# Patient Record
Sex: MALE | URBAN - METROPOLITAN AREA
[De-identification: names, ages, dates, MRNs, and addresses within clinical notes are randomized per-mention and may not be internally consistent; named-entity substitution may affect disease eponyms.]

---

## 2018-10-10 ENCOUNTER — HOSPITAL ENCOUNTER (OUTPATIENT)
Dept: RADIOLOGY | Facility: HOSPITAL | Age: 53
Discharge: HOME | End: 2018-10-10
Attending: INTERNAL MEDICINE

## 2018-10-10 LAB — HIV 1+2 AB+HIV1 P24 AG SERPL QL IA: NORMAL

## 2020-11-18 LAB
CHOLEST SERPL-MSCNC: 199 MG/DL (ref 0–200)
HDLC SERPL-MCNC: 27 MG/DL
LDLC SERPL CALC-MCNC: 97 MG/DL (ref 0–160)
TRIGLYCERIDE (LIPID PAN): 445

## 2020-12-10 ENCOUNTER — HOSPITAL ENCOUNTER (OUTPATIENT)
Dept: RADIOLOGY | Facility: HOSPITAL | Age: 55
Discharge: HOME | End: 2020-12-10
Attending: INTERNAL MEDICINE

## 2020-12-17 PROBLEM — E78.2 MIXED HYPERLIPIDEMIA: Status: ACTIVE | Noted: 2020-12-17

## 2020-12-17 PROBLEM — R73.03 PREDIABETES: Status: ACTIVE | Noted: 2020-12-17

## 2020-12-17 PROBLEM — I10 ESSENTIAL HYPERTENSION: Status: ACTIVE | Noted: 2020-12-17

## 2020-12-17 PROBLEM — M25.50 MULTIPLE JOINT PAIN: Status: ACTIVE | Noted: 2020-12-17

## 2020-12-17 NOTE — PROGRESS NOTES
Subjective:       Patient ID: Erickson Decker is a 55 y.o. male.    Chief Complaint: Follow-up    Presenting for follow up on HTN. Was last seen in [facility] on 11/20/2020. Was switched from pravastatin to crestor 20 mg and started on HCTZ 25 mg at that time. Reports that blood pressures have now been 110s-130s/80s. Has had some increased joint pain with the colder weather, especially in his hands. No adverse side effects with new medications.     Review of Systems   Constitutional: Negative for activity change, appetite change, chills, diaphoresis, fatigue, fever and unexpected weight change.   HENT: Negative for congestion, ear discharge, ear pain, facial swelling, postnasal drip, rhinorrhea, sinus pressure, sinus pain, sneezing, sore throat and trouble swallowing.    Eyes: Negative for photophobia, pain, discharge, redness, itching and visual disturbance.   Respiratory: Negative for apnea, cough, choking, chest tightness, shortness of breath, wheezing and stridor.    Cardiovascular: Negative for chest pain and palpitations.   Gastrointestinal: Negative for abdominal distention, abdominal pain, blood in stool, constipation, diarrhea, nausea and vomiting.   Endocrine: Negative for cold intolerance, heat intolerance, polydipsia, polyphagia and polyuria.   Genitourinary: Negative for decreased urine volume, difficulty urinating, dysuria, enuresis, flank pain, frequency and urgency.   Musculoskeletal: Positive for arthralgias. Negative for back pain, gait problem, joint swelling, myalgias, neck pain and neck stiffness.   Skin: Negative for color change, pallor, rash and wound.   Allergic/Immunologic: Negative for environmental allergies, food allergies and immunocompromised state.   Neurological: Negative for dizziness, tremors, seizures, syncope, facial asymmetry, speech difficulty, weakness, light-headedness, numbness and headaches.   Hematological: Does not bruise/bleed easily.    Psychiatric/Behavioral: Negative for agitation, behavioral problems, confusion, decreased concentration, dysphoric mood, hallucinations, self-injury, sleep disturbance and suicidal ideas. The patient is not nervous/anxious and is not hyperactive.        Patient Active Problem List   Diagnosis    Essential hypertension    Mixed hyperlipidemia    Multiple joint pain    Prediabetes       Objective:      Physical Exam  Vitals signs and nursing note reviewed.   Constitutional:       General: He is awake.      Appearance: Normal appearance. He is well-developed. He is obese.   HENT:      Head: Normocephalic and atraumatic.      Nose: Nose normal.      Mouth/Throat:      Mouth: Mucous membranes are moist.   Eyes:      General: Lids are normal.      Pupils: Pupils are equal, round, and reactive to light.   Neck:      Musculoskeletal: Normal range of motion and neck supple.      Vascular: No carotid bruit.   Cardiovascular:      Rate and Rhythm: Normal rate and regular rhythm.      Heart sounds: Normal heart sounds.   Pulmonary:      Effort: Pulmonary effort is normal.      Breath sounds: Normal breath sounds.   Abdominal:      General: Bowel sounds are normal.      Palpations: Abdomen is soft.   Musculoskeletal: Normal range of motion.      Right hand: He exhibits tenderness.      Left hand: He exhibits tenderness.      Right lower leg: No edema.      Left lower leg: No edema.   Skin:     General: Skin is warm and dry.   Neurological:      General: No focal deficit present.      Mental Status: He is alert and oriented to person, place, and time. Mental status is at baseline.   Psychiatric:         Attention and Perception: Attention and perception normal.         Mood and Affect: Mood normal.         Speech: Speech normal.         Behavior: Behavior normal. Behavior is cooperative.         Thought Content: Thought content normal.         Cognition and Memory: Cognition and memory normal.         Judgment: Judgment  normal.         No results found for: WBC, HGB, HCT, PLT, CHOL, TRIG, HDL, LDLDIRECT, ALT, AST, NA, K, CL, CREATININE, BUN, CO2, TSH, PSA, INR, GLUF, HGBA1C, MICROALBUR  The ASCVD Risk score ([place] ALVARENGA Jr., et al., 2013) failed to calculate for the following reasons:    Cannot find a previous HDL lab    Cannot find a previous total cholesterol lab    Assessment:       1. Essential hypertension    2. Mixed hyperlipidemia    3. Multiple joint pain    4. Prediabetes    5. BMI 32.0-32.9,adult        Plan:       Erickson was seen today for follow-up.    Diagnoses and all orders for this visit:    Essential hypertension  -     hydroCHLOROthiazide (HYDRODIURIL) 25 MG tablet; Take 1 tablet (25 mg total) by mouth once daily.    Mixed hyperlipidemia  -     rosuvastatin (CRESTOR) 20 MG tablet; Take 1 tablet (20 mg total) by mouth once daily.        -     Lipid Panel; Future    Multiple joint pain  -     meloxicam (MOBIC) 15 MG tablet; Take 1 tablet (15 mg total) by mouth once daily.    Prediabetes  -Stable. Decrease starch and sugar in diet.     BMI 32.0-32.9,adult  -Low fat diet. Avoid foods high in animal fats and fried, greasy foods.   Exercise encouraged. Recommend 150 minutes of moderate aerobic exercise a week.      Follow up in about 6 months (around 6/18/2021), or if symptoms worsen or fail to improve.

## 2020-12-18 ENCOUNTER — OFFICE VISIT (OUTPATIENT)
Dept: FAMILY MEDICINE | Facility: CLINIC | Age: 55
End: 2020-12-18
Payer: OTHER GOVERNMENT

## 2020-12-18 VITALS
HEART RATE: 109 BPM | WEIGHT: 225 LBS | OXYGEN SATURATION: 97 % | TEMPERATURE: 97 F | SYSTOLIC BLOOD PRESSURE: 142 MMHG | BODY MASS INDEX: 32.21 KG/M2 | DIASTOLIC BLOOD PRESSURE: 88 MMHG | HEIGHT: 70 IN | RESPIRATION RATE: 16 BRPM

## 2020-12-18 DIAGNOSIS — E78.2 MIXED HYPERLIPIDEMIA: ICD-10-CM

## 2020-12-18 DIAGNOSIS — M25.50 MULTIPLE JOINT PAIN: ICD-10-CM

## 2020-12-18 DIAGNOSIS — I10 ESSENTIAL HYPERTENSION: Primary | ICD-10-CM

## 2020-12-18 DIAGNOSIS — R73.03 PREDIABETES: ICD-10-CM

## 2020-12-18 PROCEDURE — 99204 OFFICE O/P NEW MOD 45 MIN: CPT | Mod: S$GLB,,, | Performed by: NURSE PRACTITIONER

## 2020-12-18 PROCEDURE — 99204 PR OFFICE/OUTPT VISIT, NEW, LEVL IV, 45-59 MIN: ICD-10-PCS | Mod: S$GLB,,, | Performed by: NURSE PRACTITIONER

## 2020-12-18 RX ORDER — MELOXICAM 15 MG/1
15 TABLET ORAL DAILY
Qty: 90 TABLET | Refills: 1 | Status: SHIPPED | OUTPATIENT
Start: 2020-12-18 | End: 2021-05-26 | Stop reason: SDUPTHER

## 2020-12-18 RX ORDER — HYDROCHLOROTHIAZIDE 25 MG/1
25 TABLET ORAL DAILY
Qty: 90 TABLET | Refills: 1 | Status: SHIPPED | OUTPATIENT
Start: 2020-12-18 | End: 2021-05-26 | Stop reason: SDUPTHER

## 2020-12-18 RX ORDER — ROSUVASTATIN CALCIUM 20 MG/1
20 TABLET, COATED ORAL DAILY
Qty: 90 TABLET | Refills: 1 | Status: SHIPPED | OUTPATIENT
Start: 2020-12-18 | End: 2021-05-26 | Stop reason: SDUPTHER

## 2020-12-18 RX ORDER — HYDROCHLOROTHIAZIDE 25 MG/1
25 TABLET ORAL DAILY
COMMUNITY
Start: 2020-11-20 | End: 2020-12-18 | Stop reason: SDUPTHER

## 2020-12-18 RX ORDER — AMOXICILLIN 500 MG
CAPSULE ORAL DAILY
COMMUNITY

## 2020-12-18 RX ORDER — ROSUVASTATIN CALCIUM 20 MG/1
20 TABLET, COATED ORAL DAILY
COMMUNITY
Start: 2020-11-20 | End: 2020-12-18 | Stop reason: SDUPTHER

## 2020-12-18 RX ORDER — MELOXICAM 15 MG/1
15 TABLET ORAL DAILY
COMMUNITY
End: 2020-12-18 | Stop reason: SDUPTHER

## 2020-12-18 NOTE — LETTER
December 18, 2020    Erickson Decker  [address] [place] [address] [address] [place] [address]             [address]  [place] [address]  Phone: [phone]  Fax: [phone] To Whom It May Concern,     Mr. Erickson Decker is an established patient with our clinic with history of hypertension. Blood pressures have been controlled and monitored. Patient is cleared to work.     If you have any questions or concerns, please don't hesitate to call [phone].    Sincerely,        KAILYN Arnold

## 2020-12-18 NOTE — PATIENT INSTRUCTIONS
Low fat diet. Avoid foods high in animal fats and fried, greasy foods.   Exercise encouraged. Recommend 150 minutes of moderate aerobic exercise a week.  Decrease starch and sugar in diet.     Lipid panel in 3 months.

## 2021-01-14 DIAGNOSIS — Z12.11 COLON CANCER SCREENING: ICD-10-CM

## 2021-03-23 ENCOUNTER — LAB VISIT (OUTPATIENT)
Dept: LAB | Facility: CLINIC | Age: 56
End: 2021-03-23
Payer: OTHER GOVERNMENT

## 2021-03-23 DIAGNOSIS — E78.2 MIXED HYPERLIPIDEMIA: ICD-10-CM

## 2021-03-23 LAB
CHOLEST SERPL-MCNC: 148 MG/DL (ref 120–199)
CHOLEST/HDLC SERPL: 5.7 {RATIO} (ref 2–5)
HDLC SERPL-MCNC: 26 MG/DL (ref 40–75)
HDLC SERPL: 17.6 % (ref 20–50)
LDLC SERPL CALC-MCNC: 46 MG/DL (ref 63–159)
NONHDLC SERPL-MCNC: 122 MG/DL
TRIGL SERPL-MCNC: 380 MG/DL (ref 30–150)

## 2021-03-23 PROCEDURE — 36415 COLL VENOUS BLD VENIPUNCTURE: CPT | Mod: ,,, | Performed by: NURSE PRACTITIONER

## 2021-03-23 PROCEDURE — 36415 PR COLLECTION VENOUS BLOOD,VENIPUNCTURE: ICD-10-PCS | Mod: ,,, | Performed by: NURSE PRACTITIONER

## 2021-03-23 PROCEDURE — 80061 LIPID PANEL: CPT | Performed by: NURSE PRACTITIONER

## 2021-04-05 ENCOUNTER — PATIENT MESSAGE (OUTPATIENT)
Dept: ADMINISTRATIVE | Facility: HOSPITAL | Age: 56
End: 2021-04-05

## 2021-05-12 ENCOUNTER — PATIENT OUTREACH (OUTPATIENT)
Dept: ADMINISTRATIVE | Facility: HOSPITAL | Age: 56
End: 2021-05-12

## 2021-05-12 DIAGNOSIS — Z11.59 ENCOUNTER FOR HEPATITIS C SCREENING TEST FOR LOW RISK PATIENT: Primary | ICD-10-CM

## 2021-05-19 ENCOUNTER — PATIENT OUTREACH (OUTPATIENT)
Dept: ADMINISTRATIVE | Facility: HOSPITAL | Age: 56
End: 2021-05-19

## 2021-05-26 ENCOUNTER — OFFICE VISIT (OUTPATIENT)
Dept: FAMILY MEDICINE | Facility: CLINIC | Age: 56
End: 2021-05-26
Payer: OTHER GOVERNMENT

## 2021-05-26 VITALS
HEIGHT: 70 IN | WEIGHT: 224.38 LBS | HEART RATE: 88 BPM | DIASTOLIC BLOOD PRESSURE: 84 MMHG | SYSTOLIC BLOOD PRESSURE: 126 MMHG | BODY MASS INDEX: 32.12 KG/M2 | TEMPERATURE: 99 F | RESPIRATION RATE: 16 BRPM | OXYGEN SATURATION: 97 %

## 2021-05-26 DIAGNOSIS — M25.50 MULTIPLE JOINT PAIN: ICD-10-CM

## 2021-05-26 DIAGNOSIS — B35.4 TINEA CORPORIS: ICD-10-CM

## 2021-05-26 DIAGNOSIS — E78.2 MIXED HYPERLIPIDEMIA: ICD-10-CM

## 2021-05-26 DIAGNOSIS — I10 ESSENTIAL HYPERTENSION: Primary | ICD-10-CM

## 2021-05-26 PROCEDURE — 99214 OFFICE O/P EST MOD 30 MIN: CPT | Mod: S$GLB,,, | Performed by: NURSE PRACTITIONER

## 2021-05-26 PROCEDURE — 99214 PR OFFICE/OUTPT VISIT, EST, LEVL IV, 30-39 MIN: ICD-10-PCS | Mod: S$GLB,,, | Performed by: NURSE PRACTITIONER

## 2021-05-26 RX ORDER — CLOTRIMAZOLE AND BETAMETHASONE DIPROPIONATE 10; .64 MG/G; MG/G
CREAM TOPICAL 2 TIMES DAILY
Qty: 15 G | Refills: 2 | Status: SHIPPED | OUTPATIENT
Start: 2021-05-26 | End: 2022-05-23

## 2021-05-26 RX ORDER — DICLOFENAC SODIUM 10 MG/G
2 GEL TOPICAL 4 TIMES DAILY
Qty: 100 G | Refills: 2 | Status: SHIPPED | OUTPATIENT
Start: 2021-05-26 | End: 2021-11-23 | Stop reason: SDUPTHER

## 2021-05-26 RX ORDER — ROSUVASTATIN CALCIUM 20 MG/1
20 TABLET, COATED ORAL DAILY
Qty: 90 TABLET | Refills: 1 | Status: SHIPPED | OUTPATIENT
Start: 2021-05-26 | End: 2021-06-04

## 2021-05-26 RX ORDER — HYDROCHLOROTHIAZIDE 25 MG/1
25 TABLET ORAL DAILY
Qty: 90 TABLET | Refills: 1 | Status: SHIPPED | OUTPATIENT
Start: 2021-05-26 | End: 2021-11-23 | Stop reason: SDUPTHER

## 2021-05-26 RX ORDER — MELOXICAM 15 MG/1
15 TABLET ORAL DAILY
Qty: 90 TABLET | Refills: 1 | Status: SHIPPED | OUTPATIENT
Start: 2021-05-26 | End: 2021-11-23 | Stop reason: SDUPTHER

## 2021-05-31 ENCOUNTER — LAB VISIT (OUTPATIENT)
Dept: LAB | Facility: CLINIC | Age: 56
End: 2021-05-31
Payer: OTHER GOVERNMENT

## 2021-05-31 ENCOUNTER — TELEPHONE (OUTPATIENT)
Dept: FAMILY MEDICINE | Facility: CLINIC | Age: 56
End: 2021-05-31

## 2021-05-31 DIAGNOSIS — E78.2 MIXED HYPERLIPIDEMIA: ICD-10-CM

## 2021-05-31 LAB
CHOLEST SERPL-MCNC: 142 MG/DL (ref 120–199)
CHOLEST/HDLC SERPL: 5.1 {RATIO} (ref 2–5)
HDLC SERPL-MCNC: 28 MG/DL (ref 40–75)
HDLC SERPL: 19.7 % (ref 20–50)
LDLC SERPL CALC-MCNC: 43 MG/DL (ref 63–159)
NONHDLC SERPL-MCNC: 114 MG/DL
TRIGL SERPL-MCNC: 355 MG/DL (ref 30–150)

## 2021-05-31 PROCEDURE — 80061 LIPID PANEL: CPT | Performed by: NURSE PRACTITIONER

## 2021-05-31 PROCEDURE — 36415 COLL VENOUS BLD VENIPUNCTURE: CPT | Mod: ,,, | Performed by: NURSE PRACTITIONER

## 2021-05-31 PROCEDURE — 36415 PR COLLECTION VENOUS BLOOD,VENIPUNCTURE: ICD-10-PCS | Mod: ,,, | Performed by: NURSE PRACTITIONER

## 2021-06-02 ENCOUNTER — PATIENT MESSAGE (OUTPATIENT)
Dept: ADMINISTRATIVE | Facility: HOSPITAL | Age: 56
End: 2021-06-02

## 2021-07-07 ENCOUNTER — PATIENT MESSAGE (OUTPATIENT)
Dept: ADMINISTRATIVE | Facility: HOSPITAL | Age: 56
End: 2021-07-07

## 2021-08-03 ENCOUNTER — PATIENT OUTREACH (OUTPATIENT)
Dept: ADMINISTRATIVE | Facility: HOSPITAL | Age: 56
End: 2021-08-03

## 2021-11-23 ENCOUNTER — OFFICE VISIT (OUTPATIENT)
Dept: FAMILY MEDICINE | Facility: CLINIC | Age: 56
End: 2021-11-23
Payer: OTHER GOVERNMENT

## 2021-11-23 ENCOUNTER — HOSPITAL ENCOUNTER (OUTPATIENT)
Dept: RADIOLOGY | Facility: CLINIC | Age: 56
Discharge: HOME | End: 2021-11-23
Attending: STUDENT IN AN ORGANIZED HEALTH CARE EDUCATION/TRAINING PROGRAM
Payer: OTHER GOVERNMENT

## 2021-11-23 ENCOUNTER — PATIENT MESSAGE (OUTPATIENT)
Dept: ADMINISTRATIVE | Facility: HOSPITAL | Age: 56
End: 2021-11-23
Payer: OTHER GOVERNMENT

## 2021-11-23 VITALS
HEART RATE: 99 BPM | HEIGHT: 70 IN | SYSTOLIC BLOOD PRESSURE: 126 MMHG | TEMPERATURE: 98 F | OXYGEN SATURATION: 98 % | BODY MASS INDEX: 32.47 KG/M2 | DIASTOLIC BLOOD PRESSURE: 78 MMHG | WEIGHT: 226.81 LBS | RESPIRATION RATE: 18 BRPM

## 2021-11-23 DIAGNOSIS — R60.0 PERIPHERAL EDEMA: ICD-10-CM

## 2021-11-23 DIAGNOSIS — Z12.5 SCREENING FOR PROSTATE CANCER: ICD-10-CM

## 2021-11-23 DIAGNOSIS — M25.40 JOINT SWELLING: ICD-10-CM

## 2021-11-23 DIAGNOSIS — M79.642 BILATERAL HAND PAIN: ICD-10-CM

## 2021-11-23 DIAGNOSIS — M79.641 BILATERAL HAND PAIN: ICD-10-CM

## 2021-11-23 DIAGNOSIS — E78.1 PURE HYPERTRIGLYCERIDEMIA: ICD-10-CM

## 2021-11-23 DIAGNOSIS — R73.03 PREDIABETES: ICD-10-CM

## 2021-11-23 DIAGNOSIS — I10 ESSENTIAL HYPERTENSION: Primary | ICD-10-CM

## 2021-11-23 DIAGNOSIS — M25.50 MULTIPLE JOINT PAIN: ICD-10-CM

## 2021-11-23 PROBLEM — F17.200 NICOTINE DEPENDENCE: Status: ACTIVE | Noted: 2021-11-23

## 2021-11-23 PROBLEM — H52.4 PRESBYOPIA: Status: ACTIVE | Noted: 2021-11-23

## 2021-11-23 PROCEDURE — 73130 XR HAND COMPLETE 3 VIEW LEFT: ICD-10-PCS | Mod: LT,S$GLB,, | Performed by: RADIOLOGY

## 2021-11-23 PROCEDURE — 73130 X-RAY EXAM OF HAND: CPT | Mod: RT,S$GLB,, | Performed by: RADIOLOGY

## 2021-11-23 PROCEDURE — 99214 PR OFFICE/OUTPT VISIT, EST, LEVL IV, 30-39 MIN: ICD-10-PCS | Mod: S$GLB,,, | Performed by: STUDENT IN AN ORGANIZED HEALTH CARE EDUCATION/TRAINING PROGRAM

## 2021-11-23 PROCEDURE — 73130 X-RAY EXAM OF HAND: CPT | Mod: LT,S$GLB,, | Performed by: RADIOLOGY

## 2021-11-23 PROCEDURE — 99214 OFFICE O/P EST MOD 30 MIN: CPT | Mod: S$GLB,,, | Performed by: STUDENT IN AN ORGANIZED HEALTH CARE EDUCATION/TRAINING PROGRAM

## 2021-11-23 RX ORDER — MELOXICAM 15 MG/1
15 TABLET ORAL DAILY
Qty: 90 TABLET | Refills: 3 | Status: SHIPPED | OUTPATIENT
Start: 2021-11-23 | End: 2022-11-17

## 2021-11-23 RX ORDER — DICLOFENAC SODIUM 10 MG/G
2 GEL TOPICAL 4 TIMES DAILY
Qty: 100 G | Refills: 2 | Status: SHIPPED | OUTPATIENT
Start: 2021-11-23 | End: 2022-06-01

## 2021-11-23 RX ORDER — FAMOTIDINE 40 MG/1
40 TABLET, FILM COATED ORAL DAILY
COMMUNITY
End: 2022-05-23

## 2021-11-23 RX ORDER — HYDROCHLOROTHIAZIDE 25 MG/1
25 TABLET ORAL DAILY
Qty: 90 TABLET | Refills: 3 | Status: SHIPPED | OUTPATIENT
Start: 2021-11-23 | End: 2022-11-17

## 2021-11-23 RX ORDER — ROSUVASTATIN CALCIUM 20 MG/1
20 TABLET, COATED ORAL DAILY
Qty: 90 TABLET | Refills: 3 | Status: SHIPPED | OUTPATIENT
Start: 2021-11-23 | End: 2022-11-17

## 2021-11-23 RX ORDER — CETIRIZINE HYDROCHLORIDE 10 MG/1
10 TABLET ORAL DAILY
COMMUNITY
End: 2022-05-23

## 2021-11-24 ENCOUNTER — LAB VISIT (OUTPATIENT)
Dept: LAB | Facility: CLINIC | Age: 56
End: 2021-11-24
Payer: OTHER GOVERNMENT

## 2021-11-24 DIAGNOSIS — R73.03 PREDIABETES: ICD-10-CM

## 2021-11-24 DIAGNOSIS — I10 ESSENTIAL HYPERTENSION: ICD-10-CM

## 2021-11-24 DIAGNOSIS — Z12.5 SCREENING FOR PROSTATE CANCER: ICD-10-CM

## 2021-11-24 DIAGNOSIS — Z11.59 ENCOUNTER FOR HEPATITIS C SCREENING TEST FOR LOW RISK PATIENT: ICD-10-CM

## 2021-11-24 DIAGNOSIS — E78.1 PURE HYPERTRIGLYCERIDEMIA: ICD-10-CM

## 2021-11-24 LAB
ALBUMIN SERPL BCP-MCNC: 3.8 G/DL (ref 3.5–5.2)
ALBUMIN/CREAT UR: 9.5 UG/MG (ref 0–30)
ALP SERPL-CCNC: 82 U/L (ref 55–135)
ALT SERPL W/O P-5'-P-CCNC: 26 U/L (ref 10–44)
ANION GAP SERPL CALC-SCNC: 12 MMOL/L (ref 8–16)
AST SERPL-CCNC: 19 U/L (ref 10–40)
BASOPHILS # BLD AUTO: 0.05 K/UL (ref 0–0.2)
BASOPHILS NFR BLD: 0.7 % (ref 0–1.9)
BILIRUB SERPL-MCNC: 0.4 MG/DL (ref 0.1–1)
BUN SERPL-MCNC: 16 MG/DL (ref 6–20)
CALCIUM SERPL-MCNC: 9.6 MG/DL (ref 8.7–10.5)
CHLORIDE SERPL-SCNC: 103 MMOL/L (ref 95–110)
CHOLEST SERPL-MCNC: 141 MG/DL (ref 120–199)
CHOLEST/HDLC SERPL: 5 {RATIO} (ref 2–5)
CO2 SERPL-SCNC: 24 MMOL/L (ref 23–29)
COMPLEXED PSA SERPL-MCNC: 0.51 NG/ML (ref 0–4)
CREAT SERPL-MCNC: 0.9 MG/DL (ref 0.5–1.4)
CREAT UR-MCNC: 158 MG/DL (ref 23–375)
DIFFERENTIAL METHOD: ABNORMAL
EOSINOPHIL # BLD AUTO: 0.4 K/UL (ref 0–0.5)
EOSINOPHIL NFR BLD: 5 % (ref 0–8)
ERYTHROCYTE [DISTWIDTH] IN BLOOD BY AUTOMATED COUNT: 14.3 % (ref 11.5–14.5)
EST. GFR  (AFRICAN AMERICAN): >60 ML/MIN/1.73 M^2
EST. GFR  (NON AFRICAN AMERICAN): >60 ML/MIN/1.73 M^2
ESTIMATED AVG GLUCOSE: 126 MG/DL (ref 68–131)
GLUCOSE SERPL-MCNC: 101 MG/DL (ref 70–110)
HBA1C MFR BLD: 6 % (ref 4–5.6)
HCT VFR BLD AUTO: 43 % (ref 40–54)
HDLC SERPL-MCNC: 28 MG/DL (ref 40–75)
HDLC SERPL: 19.9 % (ref 20–50)
HGB BLD-MCNC: 13.4 G/DL (ref 14–18)
IMM GRANULOCYTES # BLD AUTO: 0.02 K/UL (ref 0–0.04)
IMM GRANULOCYTES NFR BLD AUTO: 0.3 % (ref 0–0.5)
LDLC SERPL CALC-MCNC: 60 MG/DL (ref 63–159)
LYMPHOCYTES # BLD AUTO: 2.2 K/UL (ref 1–4.8)
LYMPHOCYTES NFR BLD: 29.4 % (ref 18–48)
MCH RBC QN AUTO: 27.4 PG (ref 27–31)
MCHC RBC AUTO-ENTMCNC: 31.2 G/DL (ref 32–36)
MCV RBC AUTO: 88 FL (ref 82–98)
MICROALBUMIN UR DL<=1MG/L-MCNC: 15 UG/ML
MONOCYTES # BLD AUTO: 0.5 K/UL (ref 0.3–1)
MONOCYTES NFR BLD: 6.2 % (ref 4–15)
NEUTROPHILS # BLD AUTO: 4.4 K/UL (ref 1.8–7.7)
NEUTROPHILS NFR BLD: 58.4 % (ref 38–73)
NONHDLC SERPL-MCNC: 113 MG/DL
NRBC BLD-RTO: 0 /100 WBC
PLATELET # BLD AUTO: 303 K/UL (ref 150–450)
PMV BLD AUTO: 10.4 FL (ref 9.2–12.9)
POTASSIUM SERPL-SCNC: 4 MMOL/L (ref 3.5–5.1)
PROT SERPL-MCNC: 8 G/DL (ref 6–8.4)
RBC # BLD AUTO: 4.89 M/UL (ref 4.6–6.2)
SODIUM SERPL-SCNC: 139 MMOL/L (ref 136–145)
TRIGL SERPL-MCNC: 265 MG/DL (ref 30–150)
TSH SERPL DL<=0.005 MIU/L-ACNC: 0.97 UIU/ML (ref 0.4–4)
WBC # BLD AUTO: 7.55 K/UL (ref 3.9–12.7)

## 2021-11-24 PROCEDURE — 80053 COMPREHEN METABOLIC PANEL: CPT | Performed by: STUDENT IN AN ORGANIZED HEALTH CARE EDUCATION/TRAINING PROGRAM

## 2021-11-24 PROCEDURE — 36415 PR COLLECTION VENOUS BLOOD,VENIPUNCTURE: ICD-10-PCS | Mod: ,,, | Performed by: STUDENT IN AN ORGANIZED HEALTH CARE EDUCATION/TRAINING PROGRAM

## 2021-11-24 PROCEDURE — 86803 HEPATITIS C AB TEST: CPT | Performed by: NURSE PRACTITIONER

## 2021-11-24 PROCEDURE — 85025 COMPLETE CBC W/AUTO DIFF WBC: CPT | Performed by: STUDENT IN AN ORGANIZED HEALTH CARE EDUCATION/TRAINING PROGRAM

## 2021-11-24 PROCEDURE — 84443 ASSAY THYROID STIM HORMONE: CPT | Performed by: STUDENT IN AN ORGANIZED HEALTH CARE EDUCATION/TRAINING PROGRAM

## 2021-11-24 PROCEDURE — 82570 ASSAY OF URINE CREATININE: CPT | Performed by: STUDENT IN AN ORGANIZED HEALTH CARE EDUCATION/TRAINING PROGRAM

## 2021-11-24 PROCEDURE — 80061 LIPID PANEL: CPT | Performed by: STUDENT IN AN ORGANIZED HEALTH CARE EDUCATION/TRAINING PROGRAM

## 2021-11-24 PROCEDURE — 36415 COLL VENOUS BLD VENIPUNCTURE: CPT | Mod: ,,, | Performed by: STUDENT IN AN ORGANIZED HEALTH CARE EDUCATION/TRAINING PROGRAM

## 2021-11-24 PROCEDURE — 84153 ASSAY OF PSA TOTAL: CPT | Performed by: STUDENT IN AN ORGANIZED HEALTH CARE EDUCATION/TRAINING PROGRAM

## 2021-11-24 PROCEDURE — 83036 HEMOGLOBIN GLYCOSYLATED A1C: CPT | Performed by: STUDENT IN AN ORGANIZED HEALTH CARE EDUCATION/TRAINING PROGRAM

## 2021-11-26 LAB — HCV AB SERPL QL IA: NEGATIVE

## 2021-12-14 ENCOUNTER — OFFICE VISIT (OUTPATIENT)
Dept: URGENT CARE | Facility: CLINIC | Age: 56
End: 2021-12-14
Payer: OTHER GOVERNMENT

## 2021-12-14 VITALS
BODY MASS INDEX: 32.35 KG/M2 | RESPIRATION RATE: 18 BRPM | OXYGEN SATURATION: 97 % | SYSTOLIC BLOOD PRESSURE: 151 MMHG | HEIGHT: 70 IN | WEIGHT: 226 LBS | HEART RATE: 85 BPM | DIASTOLIC BLOOD PRESSURE: 93 MMHG | TEMPERATURE: 98 F

## 2021-12-14 DIAGNOSIS — I10 HYPERTENSION, UNSPECIFIED TYPE: ICD-10-CM

## 2021-12-14 DIAGNOSIS — Z11.52 ENCOUNTER FOR SCREENING FOR COVID-19: Primary | ICD-10-CM

## 2021-12-14 LAB
CTP QC/QA: YES
SARS-COV-2 AG RESP QL IA.RAPID: NEGATIVE

## 2021-12-14 PROCEDURE — 99203 OFFICE O/P NEW LOW 30 MIN: CPT | Mod: S$GLB,,, | Performed by: NURSE PRACTITIONER

## 2021-12-14 PROCEDURE — 87426 SARSCOV CORONAVIRUS AG IA: CPT | Mod: QW,S$GLB,, | Performed by: NURSE PRACTITIONER

## 2021-12-14 PROCEDURE — 87426 SARS CORONAVIRUS 2 ANTIGEN POCT: ICD-10-PCS | Mod: QW,S$GLB,, | Performed by: NURSE PRACTITIONER

## 2021-12-14 PROCEDURE — 99203 PR OFFICE/OUTPT VISIT, NEW, LEVL III, 30-44 MIN: ICD-10-PCS | Mod: S$GLB,,, | Performed by: NURSE PRACTITIONER

## 2022-05-23 ENCOUNTER — OFFICE VISIT (OUTPATIENT)
Dept: FAMILY MEDICINE | Facility: CLINIC | Age: 57
End: 2022-05-23
Payer: OTHER GOVERNMENT

## 2022-05-23 ENCOUNTER — LAB VISIT (OUTPATIENT)
Dept: LAB | Facility: CLINIC | Age: 57
End: 2022-05-23
Payer: OTHER GOVERNMENT

## 2022-05-23 VITALS
BODY MASS INDEX: 32.45 KG/M2 | SYSTOLIC BLOOD PRESSURE: 128 MMHG | RESPIRATION RATE: 18 BRPM | WEIGHT: 226.63 LBS | DIASTOLIC BLOOD PRESSURE: 80 MMHG | OXYGEN SATURATION: 97 % | TEMPERATURE: 99 F | HEART RATE: 80 BPM | HEIGHT: 70 IN

## 2022-05-23 DIAGNOSIS — M25.40 JOINT SWELLING: ICD-10-CM

## 2022-05-23 DIAGNOSIS — E78.1 PURE HYPERTRIGLYCERIDEMIA: ICD-10-CM

## 2022-05-23 DIAGNOSIS — R60.0 PERIPHERAL EDEMA: ICD-10-CM

## 2022-05-23 DIAGNOSIS — M25.50 MULTIPLE JOINT PAIN: ICD-10-CM

## 2022-05-23 DIAGNOSIS — R73.03 PREDIABETES: ICD-10-CM

## 2022-05-23 DIAGNOSIS — I10 ESSENTIAL HYPERTENSION: Primary | ICD-10-CM

## 2022-05-23 PROCEDURE — 99999 PR PBB SHADOW E&M-EST. PATIENT-LVL IV: ICD-10-PCS | Mod: PBBFAC,,, | Performed by: STUDENT IN AN ORGANIZED HEALTH CARE EDUCATION/TRAINING PROGRAM

## 2022-05-23 PROCEDURE — 83036 HEMOGLOBIN GLYCOSYLATED A1C: CPT | Performed by: STUDENT IN AN ORGANIZED HEALTH CARE EDUCATION/TRAINING PROGRAM

## 2022-05-23 PROCEDURE — 36415 COLL VENOUS BLD VENIPUNCTURE: CPT | Mod: PN,,, | Performed by: STUDENT IN AN ORGANIZED HEALTH CARE EDUCATION/TRAINING PROGRAM

## 2022-05-23 PROCEDURE — 99214 OFFICE O/P EST MOD 30 MIN: CPT | Mod: PBBFAC,PN | Performed by: STUDENT IN AN ORGANIZED HEALTH CARE EDUCATION/TRAINING PROGRAM

## 2022-05-23 PROCEDURE — 99214 OFFICE O/P EST MOD 30 MIN: CPT | Mod: S$PBB,,, | Performed by: STUDENT IN AN ORGANIZED HEALTH CARE EDUCATION/TRAINING PROGRAM

## 2022-05-23 PROCEDURE — 99214 PR OFFICE/OUTPT VISIT, EST, LEVL IV, 30-39 MIN: ICD-10-PCS | Mod: S$PBB,,, | Performed by: STUDENT IN AN ORGANIZED HEALTH CARE EDUCATION/TRAINING PROGRAM

## 2022-05-23 PROCEDURE — 99999 PR PBB SHADOW E&M-EST. PATIENT-LVL IV: CPT | Mod: PBBFAC,,, | Performed by: STUDENT IN AN ORGANIZED HEALTH CARE EDUCATION/TRAINING PROGRAM

## 2022-05-23 PROCEDURE — 36415 PR COLLECTION VENOUS BLOOD,VENIPUNCTURE: ICD-10-PCS | Mod: PN,,, | Performed by: STUDENT IN AN ORGANIZED HEALTH CARE EDUCATION/TRAINING PROGRAM

## 2022-05-23 NOTE — PATIENT INSTRUCTIONS

## 2022-05-23 NOTE — ASSESSMENT & PLAN NOTE
Continue  Hyperlipidemia Medications             omega-3 fatty acids/fish oil (FISH OIL-OMEGA-3 FATTY ACIDS) 300-1,000 mg capsule Take by mouth once daily.    rosuvastatin (CRESTOR) 20 MG tablet Take 1 tablet (20 mg total) by mouth once daily.

## 2022-05-23 NOTE — ASSESSMENT & PLAN NOTE
Continue  Hypertension Medications             hydroCHLOROthiazide (HYDRODIURIL) 25 MG tablet Take 1 tablet (25 mg total) by mouth once daily.

## 2022-05-23 NOTE — PROGRESS NOTES
Subjective:       Patient ID: Erickson Decker is a 56 y.o. male.    Chief Complaint: Follow-up (6 month ) and Hypertension    Essential hypertension: Not regularly checking bps.  BP Readings from Last 3 Encounters:  12/14/21 : (!) 151/93  11/23/21 : 126/78  05/26/21 : 126/84     Hypertension Medications     hydroCHLOROthiazide (HYDRODIURIL) 25 MG tablet Take 1 tablet (25 mg total) by mouth once daily.       Patient denies headache, chest pain, palpitations, shortness of breath.     Hyperlipidemia: Patient's hyperlipidemia is well controlled. Taking Crestor daily with side effects: none. Lab Results       Component                Value               Date                       CHOL                     141                 11/24/2021                 CHOL                     142                 05/31/2021                 CHOL                     148                 03/23/2021            Lab Results       Component                Value               Date                       HDL                      28 (L)              11/24/2021                 HDL                      28 (L)              05/31/2021                 HDL                      26 (L)              03/23/2021            Lab Results       Component                Value               Date                       LDLCALC                  60.0 (L)            11/24/2021                 LDLCALC                  43.0 (L)            05/31/2021                 LDLCALC                  46.0 (L)            03/23/2021            Lab Results       Component                Value               Date                       TRIG                     265 (H)             11/24/2021                 TRIG                     355 (H)             05/31/2021                 TRIG                     380 (H)             03/23/2021            Lab Results       Component                Value               Date                       CHOLHDL                  19.9 (L)            11/24/2021                  CHOLHDL                  19.7 (L)            05/31/2021                 CHOLHDL                  17.6 (L)            03/23/2021                The 10-year ASCVD risk score ([place] ALVARENGA Jr., et al., 2013) is: 9.8%    Values used to calculate the score:      Age: 56 years      Sex: Male      Is Non- : No      Diabetic: No      Tobacco smoker: No      Systolic Blood Pressure: 151 mmHg      Is BP treated: No      HDL Cholesterol: 28 mg/dL      Total Cholesterol: 141 mg/dL    Prediabetes- not on medication.  Lab Results       Component                Value               Date                       HGBA1C                   6.0 (H)             11/24/2021                Review of Systems   Constitutional: Negative for activity change, appetite change, fatigue and unexpected weight change.   HENT: Negative for trouble swallowing.    Respiratory: Negative for shortness of breath.    Cardiovascular: Negative for chest pain and leg swelling.   Gastrointestinal: Negative for abdominal pain, blood in stool, change in bowel habit and change in bowel habit.   Endocrine: Negative for cold intolerance, heat intolerance, polydipsia and polyuria.   Genitourinary: Negative for decreased urine volume, difficulty urinating, dysuria, frequency, hematuria and urgency.   Musculoskeletal: Positive for arthralgias and joint swelling. Negative for back pain and gait problem.   Integumentary:  Negative for rash and wound.   Neurological: Negative for dizziness, weakness and headaches.   Psychiatric/Behavioral: Negative for dysphoric mood and sleep disturbance.         Objective:      Physical Exam  Constitutional:       General: He is not in acute distress.     Appearance: Normal appearance. He is not ill-appearing.   Eyes:      Conjunctiva/sclera: Conjunctivae normal.   Cardiovascular:      Rate and Rhythm: Normal rate and regular rhythm.      Pulses: Normal pulses.      Heart sounds: Normal heart sounds. No  murmur heard.  Pulmonary:      Effort: Pulmonary effort is normal. No respiratory distress.      Breath sounds: Normal breath sounds. No wheezing.   Abdominal:      Palpations: Abdomen is soft.   Musculoskeletal:      Right lower leg: No edema.      Left lower leg: No edema.   Skin:     General: Skin is warm and dry.      Findings: No rash.   Neurological:      Mental Status: He is alert. Mental status is at baseline.      Gait: Gait normal.   Psychiatric:         Mood and Affect: Mood normal.         Behavior: Behavior normal.         Thought Content: Thought content normal.         Judgment: Judgment normal.         Assessment:       1. Essential hypertension    2. Pure hypertriglyceridemia    3. Prediabetes    4. Peripheral edema    5. Joint swelling    6. Multiple joint pain        Plan:       Problem List Items Addressed This Visit        Cardiac/Vascular    Essential hypertension - Primary     Continue  Hypertension Medications             hydroCHLOROthiazide (HYDRODIURIL) 25 MG tablet Take 1 tablet (25 mg total) by mouth once daily.                   Pure hypertriglyceridemia     Continue  Hyperlipidemia Medications             omega-3 fatty acids/fish oil (FISH OIL-OMEGA-3 FATTY ACIDS) 300-1,000 mg capsule Take by mouth once daily.    rosuvastatin (CRESTOR) 20 MG tablet Take 1 tablet (20 mg total) by mouth once daily.                      Endocrine    Prediabetes     Continue diet control           Relevant Orders    Hemoglobin A1C       Orthopedic    Multiple joint pain     Doing well on prn mobic           Joint swelling       Other    Peripheral edema     Doing better                   Medication List with Changes/Refills   Current Medications    DICLOFENAC SODIUM (VOLTAREN) 1 % GEL    Apply 2 g topically 4 (four) times daily.       Start Date: 11/23/2021End Date: --    HYDROCHLOROTHIAZIDE (HYDRODIURIL) 25 MG TABLET    Take 1 tablet (25 mg total) by mouth once daily.       Start Date: 11/23/2021End Date:  2/21/2022    MELOXICAM (MOBIC) 15 MG TABLET    Take 1 tablet (15 mg total) by mouth once daily.       Start Date: 11/23/2021End Date: --    OMEGA-3 FATTY ACIDS/FISH OIL (FISH OIL-OMEGA-3 FATTY ACIDS) 300-1,000 MG CAPSULE    Take by mouth once daily.       Start Date: --        End Date: --    ROSUVASTATIN (CRESTOR) 20 MG TABLET    Take 1 tablet (20 mg total) by mouth once daily.       Start Date: 11/23/2021End Date: --   Discontinued Medications    CETIRIZINE (ZYRTEC) 10 MG TABLET    Take 10 mg by mouth once daily.       Start Date: --        End Date: 5/23/2022    CLOTRIMAZOLE-BETAMETHASONE 1-0.05% (LOTRISONE) CREAM    Apply topically 2 (two) times daily.       Start Date: 5/26/2021 End Date: 5/23/2022    FAMOTIDINE (PEPCID) 40 MG TABLET    Take 40 mg by mouth once daily.       Start Date: --        End Date: 5/23/2022

## 2022-05-24 LAB
ESTIMATED AVG GLUCOSE: 123 MG/DL (ref 68–131)
HBA1C MFR BLD: 5.9 % (ref 4–5.6)

## 2022-06-27 ENCOUNTER — OFFICE VISIT (OUTPATIENT)
Dept: URGENT CARE | Facility: CLINIC | Age: 57
End: 2022-06-27
Payer: OTHER GOVERNMENT

## 2022-06-27 VITALS
RESPIRATION RATE: 16 BRPM | SYSTOLIC BLOOD PRESSURE: 154 MMHG | WEIGHT: 226 LBS | TEMPERATURE: 98 F | DIASTOLIC BLOOD PRESSURE: 100 MMHG | BODY MASS INDEX: 32.43 KG/M2 | OXYGEN SATURATION: 98 % | HEART RATE: 102 BPM

## 2022-06-27 DIAGNOSIS — U07.1 COVID-19: ICD-10-CM

## 2022-06-27 DIAGNOSIS — U07.1 COVID-19 VIRUS DETECTED: ICD-10-CM

## 2022-06-27 DIAGNOSIS — Z20.822 CLOSE EXPOSURE TO COVID-19 VIRUS: ICD-10-CM

## 2022-06-27 DIAGNOSIS — R09.81 SINUS CONGESTION: Primary | ICD-10-CM

## 2022-06-27 LAB
CTP QC/QA: YES
SARS-COV-2 AG RESP QL IA.RAPID: POSITIVE

## 2022-06-27 PROCEDURE — 99214 OFFICE O/P EST MOD 30 MIN: CPT | Mod: S$GLB,,, | Performed by: STUDENT IN AN ORGANIZED HEALTH CARE EDUCATION/TRAINING PROGRAM

## 2022-06-27 PROCEDURE — 99214 PR OFFICE/OUTPT VISIT, EST, LEVL IV, 30-39 MIN: ICD-10-PCS | Mod: S$GLB,,, | Performed by: STUDENT IN AN ORGANIZED HEALTH CARE EDUCATION/TRAINING PROGRAM

## 2022-06-27 PROCEDURE — 87811 SARS-COV-2 COVID19 W/OPTIC: CPT | Mod: QW,S$GLB,, | Performed by: STUDENT IN AN ORGANIZED HEALTH CARE EDUCATION/TRAINING PROGRAM

## 2022-06-27 PROCEDURE — 87811 SARS CORONAVIRUS 2 ANTIGEN POCT, MANUAL READ: ICD-10-PCS | Mod: QW,S$GLB,, | Performed by: STUDENT IN AN ORGANIZED HEALTH CARE EDUCATION/TRAINING PROGRAM

## 2022-06-27 RX ORDER — FAMOTIDINE 40 MG/1
40 TABLET, FILM COATED ORAL DAILY
Qty: 10 TABLET | Refills: 0 | Status: SHIPPED | OUTPATIENT
Start: 2022-06-27 | End: 2022-07-04

## 2022-06-27 RX ORDER — PROMETHAZINE HYDROCHLORIDE AND DEXTROMETHORPHAN HYDROBROMIDE 6.25; 15 MG/5ML; MG/5ML
5 SYRUP ORAL
Qty: 118 ML | Refills: 0 | Status: SHIPPED | OUTPATIENT
Start: 2022-06-27 | End: 2022-07-07

## 2022-06-27 RX ORDER — AMOXICILLIN AND CLAVULANATE POTASSIUM 875; 125 MG/1; MG/1
1 TABLET, FILM COATED ORAL EVERY 12 HOURS
Qty: 20 TABLET | Refills: 0 | Status: SHIPPED | OUTPATIENT
Start: 2022-06-27 | End: 2022-07-07

## 2022-06-27 RX ORDER — FLUTICASONE PROPIONATE 50 MCG
1 SPRAY, SUSPENSION (ML) NASAL DAILY
Qty: 15.8 ML | Refills: 0 | Status: SHIPPED | OUTPATIENT
Start: 2022-06-27 | End: 2022-11-23

## 2022-06-27 RX ORDER — CETIRIZINE HYDROCHLORIDE 10 MG/1
10 TABLET ORAL DAILY
Qty: 10 TABLET | Refills: 0 | Status: SHIPPED | OUTPATIENT
Start: 2022-06-27 | End: 2022-11-23

## 2022-06-27 NOTE — PROGRESS NOTES
Subjective:       Patient ID: Erickson Decker is a 56 y.o. male.    Vitals:  weight is 102.5 kg (226 lb). His temperature is 98.4 °F (36.9 °C). His blood pressure is 154/100 (abnormal) and his pulse is 102. His respiration is 16 and oxygen saturation is 98%.     Chief Complaint: Sinus Problem    Patient is a 56-year-old male with a past medical history of hypertension, hyperlipidemia, and prediabetes who presents to clinic for COVID testing.  Patient reports he is vaccinated.  Patient reports positive COVID exposure.  Patient reports last exposure was 3 days ago.  Patient reports was contacted by his employer yesterday stating positive COVID exposure and he needed to be tested before returning.  Patient reports symptoms x2 days.  Patient complaining of fatigue, nasal sinus congestion with postnasal drainage, sinus pain and pressure, sore throat, right ear pain, productive cough, generalized body aches, generalized headaches, and sneezing.  Patient reports fatigue likely due to lack of sleeping from nasal sinus congestion and cough.  Patient denies any acute dizziness, fever or chills, chest pain or palpitations, shortness of breath or abnormal breath sounds, abdominal pain, nausea or vomiting, diarrhea, rash, or change in mentation.  Patient reports no over-the-counter medications for symptoms at this point.  Patient states has been standing hot shower to help with nasal congestion which only helps for a few hours.    Sinus Problem  This is a new problem. The current episode started yesterday. The problem is unchanged. There has been no fever. Associated symptoms include congestion, coughing, ear pain (Right), headaches, sinus pressure, sneezing and a sore throat. Pertinent negatives include no chills, diaphoresis or shortness of breath. Past treatments include nothing.       Constitution: Positive for fatigue. Negative for chills, sweating and fever.   HENT: Positive for ear pain (Right), congestion, postnasal  drip, sinus pain, sinus pressure and sore throat. Negative for ear discharge, tinnitus, hearing loss and trouble swallowing.    Neck: neck negative. Negative for painful lymph nodes.   Cardiovascular: Negative.  Negative for chest pain and palpitations.   Eyes: Negative.    Respiratory: Positive for cough and sputum production. Negative for chest tightness, shortness of breath and wheezing.    Gastrointestinal: Negative.  Negative for abdominal pain, nausea, vomiting and diarrhea.   Endocrine: negative.   Genitourinary: Negative.  Negative for dysuria, frequency and urgency.   Musculoskeletal: Positive for muscle ache.   Skin: Negative.  Negative for color change, pale, rash and erythema.   Allergic/Immunologic: Positive for sneezing.   Neurological: Positive for headaches. Negative for dizziness, light-headedness, passing out, disorientation and altered mental status.   Hematologic/Lymphatic: Negative.  Negative for swollen lymph nodes.   Psychiatric/Behavioral: Negative.  Negative for altered mental status, disorientation and confusion.       Objective:      Physical Exam   Constitutional: He is oriented to person, place, and time. He appears well-developed. He is cooperative.  Non-toxic appearance. He appears ill. No distress.   HENT:   Head: Normocephalic and atraumatic.   Ears:   Right Ear: Hearing, external ear and ear canal normal. A middle ear effusion is present.   Left Ear: Hearing, tympanic membrane, external ear and ear canal normal.   Nose: Rhinorrhea and congestion present. No mucosal edema or nasal deformity. No epistaxis. Right sinus exhibits maxillary sinus tenderness. Right sinus exhibits no frontal sinus tenderness. Left sinus exhibits maxillary sinus tenderness. Left sinus exhibits no frontal sinus tenderness.   Mouth/Throat: Uvula is midline and mucous membranes are normal. Mucous membranes are moist. No trismus in the jaw. Normal dentition. No uvula swelling. Posterior oropharyngeal erythema  and cobblestoning present. No oropharyngeal exudate. Oropharynx is clear.   Eyes: Conjunctivae and lids are normal. Pupils are equal, round, and reactive to light. Right eye exhibits no discharge. Left eye exhibits no discharge. No scleral icterus.   Neck: Trachea normal and phonation normal. Neck supple. No neck rigidity present.   Cardiovascular: Normal rate, regular rhythm, normal heart sounds and normal pulses.   Pulmonary/Chest: Effort normal and breath sounds normal. No respiratory distress (Unlabored.  Equal rise and fall of chest.  Able speak in full complete sentences.). He has no wheezes. He has no rhonchi. He has no rales.   Abdominal: Normal appearance and bowel sounds are normal. He exhibits no distension. Soft. There is no abdominal tenderness.   Musculoskeletal: Normal range of motion.         General: No deformity. Normal range of motion.      Cervical back: He exhibits no tenderness.   Lymphadenopathy:     He has no cervical adenopathy.   Neurological: He is alert and oriented to person, place, and time. He exhibits normal muscle tone. Coordination normal.   Skin: Skin is warm, dry, intact, not diaphoretic, not pale and no rash. Capillary refill takes less than 2 seconds. No erythema   Psychiatric: His speech is normal and behavior is normal. Judgment and thought content normal.   Nursing note and vitals reviewed.        Assessment:       1. Sinus congestion    2. Close exposure to COVID-19 virus    3. COVID-19          Plan:         Sinus congestion  -     SARS Coronavirus 2 Antigen, POCT Manual Read    Close exposure to COVID-19 virus  -     SARS Coronavirus 2 Antigen, POCT Manual Read    COVID-19    Other orders  -     amoxicillin-clavulanate 875-125mg (AUGMENTIN) 875-125 mg per tablet; Take 1 tablet by mouth every 12 (twelve) hours. for 10 days  Dispense: 20 tablet; Refill: 0  -     fluticasone propionate (FLONASE) 50 mcg/actuation nasal spray; 1 spray (50 mcg total) by Each Nostril route once  daily.  Dispense: 15.8 mL; Refill: 0  -     cetirizine (ZYRTEC) 10 MG tablet; Take 1 tablet (10 mg total) by mouth once daily. for 10 days  Dispense: 10 tablet; Refill: 0  -     famotidine (PEPCID) 40 MG tablet; Take 1 tablet (40 mg total) by mouth once daily. for 10 days  Dispense: 10 tablet; Refill: 0  -     promethazine-dextromethorphan (PROMETHAZINE-DM) 6.25-15 mg/5 mL Syrp; Take 5 mLs by mouth every 4 to 6 hours as needed (Cough).  Dispense: 118 mL; Refill: 0                 Labs:  COVID positive.  Quarantine as directed.  Quarantine information provided to patient.  COVID recommendations provided.  (Vitamin-C, vitamin D3, Pepcid, Zyrtec, zinc)  Tylenol per package instructions for any pain or fever.  May rotate with Motrin if unable to control pain or fever along with Tylenol.  Monitor home SpO2 and present to emergency department with readings less than 92%.  Take medications as prescribed.  Assure adequate hydration.  Follow-up with PCP in 1-2 days.  Return to clinic as needed.  To ED for any new or acutely worsening symptoms including but not limited to chest pain, palpitations, shortness of breath, or fever greater than 103° F.  Patient in agreement with plan of care.    DISCLAIMER: Please note that my documentation in this Electronic Healthcare Record was produced using speech recognition software and therefore may contain errors related to that software system.These could include grammar, punctuation and spelling errors or the inclusion/exclusion of phrases that were not intended. Garbled syntax, mangled pronouns, and other bizarre constructions may be attributed to that software system.

## 2022-09-28 ENCOUNTER — PATIENT MESSAGE (OUTPATIENT)
Dept: FAMILY MEDICINE | Facility: CLINIC | Age: 57
End: 2022-09-28
Payer: OTHER GOVERNMENT

## 2022-09-28 ENCOUNTER — TELEPHONE (OUTPATIENT)
Dept: FAMILY MEDICINE | Facility: CLINIC | Age: 57
End: 2022-09-28
Payer: OTHER GOVERNMENT

## 2022-09-28 NOTE — TELEPHONE ENCOUNTER
LVM for pt to return call to clinic to reschedule November appointment due to provider moving to another location.   Portal message sent

## 2022-11-23 ENCOUNTER — OFFICE VISIT (OUTPATIENT)
Dept: FAMILY MEDICINE | Facility: CLINIC | Age: 57
End: 2022-11-23
Payer: OTHER GOVERNMENT

## 2022-11-23 ENCOUNTER — LAB VISIT (OUTPATIENT)
Dept: LAB | Facility: CLINIC | Age: 57
End: 2022-11-23
Payer: OTHER GOVERNMENT

## 2022-11-23 VITALS
HEART RATE: 91 BPM | WEIGHT: 224.63 LBS | BODY MASS INDEX: 32.16 KG/M2 | OXYGEN SATURATION: 96 % | TEMPERATURE: 99 F | SYSTOLIC BLOOD PRESSURE: 140 MMHG | HEIGHT: 70 IN | DIASTOLIC BLOOD PRESSURE: 70 MMHG

## 2022-11-23 DIAGNOSIS — R73.03 PREDIABETES: ICD-10-CM

## 2022-11-23 DIAGNOSIS — M25.50 MULTIPLE JOINT PAIN: ICD-10-CM

## 2022-11-23 DIAGNOSIS — R53.83 FATIGUE, UNSPECIFIED TYPE: ICD-10-CM

## 2022-11-23 DIAGNOSIS — Z12.5 SCREENING FOR PROSTATE CANCER: ICD-10-CM

## 2022-11-23 DIAGNOSIS — M25.431 PAIN AND SWELLING OF RIGHT WRIST: ICD-10-CM

## 2022-11-23 DIAGNOSIS — M79.642 BILATERAL HAND PAIN: ICD-10-CM

## 2022-11-23 DIAGNOSIS — M25.40 JOINT SWELLING: ICD-10-CM

## 2022-11-23 DIAGNOSIS — M79.641 BILATERAL HAND PAIN: ICD-10-CM

## 2022-11-23 DIAGNOSIS — R60.0 PERIPHERAL EDEMA: ICD-10-CM

## 2022-11-23 DIAGNOSIS — E78.1 PURE HYPERTRIGLYCERIDEMIA: Primary | ICD-10-CM

## 2022-11-23 DIAGNOSIS — I10 ESSENTIAL HYPERTENSION: ICD-10-CM

## 2022-11-23 DIAGNOSIS — M25.531 PAIN AND SWELLING OF RIGHT WRIST: ICD-10-CM

## 2022-11-23 DIAGNOSIS — E78.1 PURE HYPERTRIGLYCERIDEMIA: ICD-10-CM

## 2022-11-23 LAB
ALBUMIN SERPL BCP-MCNC: 4.1 G/DL (ref 3.5–5.2)
ALP SERPL-CCNC: 81 U/L (ref 55–135)
ALT SERPL W/O P-5'-P-CCNC: 25 U/L (ref 10–44)
ANION GAP SERPL CALC-SCNC: 10 MMOL/L (ref 8–16)
AST SERPL-CCNC: 19 U/L (ref 10–40)
BASOPHILS # BLD AUTO: 0.04 K/UL (ref 0–0.2)
BASOPHILS NFR BLD: 0.6 % (ref 0–1.9)
BILIRUB SERPL-MCNC: 0.4 MG/DL (ref 0.1–1)
BUN SERPL-MCNC: 18 MG/DL (ref 6–20)
CALCIUM SERPL-MCNC: 10.1 MG/DL (ref 8.7–10.5)
CHLORIDE SERPL-SCNC: 103 MMOL/L (ref 95–110)
CHOLEST SERPL-MCNC: 148 MG/DL (ref 120–199)
CHOLEST/HDLC SERPL: 4.9 {RATIO} (ref 2–5)
CO2 SERPL-SCNC: 28 MMOL/L (ref 23–29)
COMPLEXED PSA SERPL-MCNC: 0.44 NG/ML (ref 0–4)
CREAT SERPL-MCNC: 1.1 MG/DL (ref 0.5–1.4)
CRP SERPL-MCNC: 5.5 MG/L (ref 0–8.2)
DIFFERENTIAL METHOD: NORMAL
EOSINOPHIL # BLD AUTO: 0.2 K/UL (ref 0–0.5)
EOSINOPHIL NFR BLD: 3.3 % (ref 0–8)
ERYTHROCYTE [DISTWIDTH] IN BLOOD BY AUTOMATED COUNT: 13.9 % (ref 11.5–14.5)
ERYTHROCYTE [SEDIMENTATION RATE] IN BLOOD BY WESTERGREN METHOD: 23 MM/HR (ref 0–10)
EST. GFR  (NO RACE VARIABLE): >60 ML/MIN/1.73 M^2
ESTIMATED AVG GLUCOSE: 128 MG/DL (ref 68–131)
GLUCOSE SERPL-MCNC: 119 MG/DL (ref 70–110)
HBA1C MFR BLD: 6.1 % (ref 4–5.6)
HCT VFR BLD AUTO: 44.4 % (ref 40–54)
HDLC SERPL-MCNC: 30 MG/DL (ref 40–75)
HDLC SERPL: 20.3 % (ref 20–50)
HGB BLD-MCNC: 14.2 G/DL (ref 14–18)
IMM GRANULOCYTES # BLD AUTO: 0.01 K/UL (ref 0–0.04)
IMM GRANULOCYTES NFR BLD AUTO: 0.1 % (ref 0–0.5)
LDLC SERPL CALC-MCNC: 64 MG/DL (ref 63–159)
LYMPHOCYTES # BLD AUTO: 2.3 K/UL (ref 1–4.8)
LYMPHOCYTES NFR BLD: 31.3 % (ref 18–48)
MCH RBC QN AUTO: 27.8 PG (ref 27–31)
MCHC RBC AUTO-ENTMCNC: 32 G/DL (ref 32–36)
MCV RBC AUTO: 87 FL (ref 82–98)
MONOCYTES # BLD AUTO: 0.5 K/UL (ref 0.3–1)
MONOCYTES NFR BLD: 7.4 % (ref 4–15)
NEUTROPHILS # BLD AUTO: 4.1 K/UL (ref 1.8–7.7)
NEUTROPHILS NFR BLD: 57.3 % (ref 38–73)
NONHDLC SERPL-MCNC: 118 MG/DL
NRBC BLD-RTO: 0 /100 WBC
PLATELET # BLD AUTO: 304 K/UL (ref 150–450)
PMV BLD AUTO: 10.3 FL (ref 9.2–12.9)
POTASSIUM SERPL-SCNC: 3.7 MMOL/L (ref 3.5–5.1)
PROT SERPL-MCNC: 8.5 G/DL (ref 6–8.4)
RBC # BLD AUTO: 5.11 M/UL (ref 4.6–6.2)
RHEUMATOID FACT SERPL-ACNC: <13 IU/ML (ref 0–15)
SODIUM SERPL-SCNC: 141 MMOL/L (ref 136–145)
TRIGL SERPL-MCNC: 270 MG/DL (ref 30–150)
TSH SERPL DL<=0.005 MIU/L-ACNC: 0.98 UIU/ML (ref 0.4–4)
URATE SERPL-MCNC: 6.4 MG/DL (ref 3.4–7)
WBC # BLD AUTO: 7.21 K/UL (ref 3.9–12.7)

## 2022-11-23 PROCEDURE — 86039 ANTINUCLEAR ANTIBODIES (ANA): CPT | Performed by: FAMILY MEDICINE

## 2022-11-23 PROCEDURE — 85651 RBC SED RATE NONAUTOMATED: CPT | Performed by: FAMILY MEDICINE

## 2022-11-23 PROCEDURE — 99999 PR PBB SHADOW E&M-EST. PATIENT-LVL III: ICD-10-PCS | Mod: PBBFAC,,, | Performed by: FAMILY MEDICINE

## 2022-11-23 PROCEDURE — 83036 HEMOGLOBIN GLYCOSYLATED A1C: CPT | Performed by: FAMILY MEDICINE

## 2022-11-23 PROCEDURE — 99213 OFFICE O/P EST LOW 20 MIN: CPT | Mod: PBBFAC,PN | Performed by: FAMILY MEDICINE

## 2022-11-23 PROCEDURE — 86140 C-REACTIVE PROTEIN: CPT | Performed by: FAMILY MEDICINE

## 2022-11-23 PROCEDURE — 99214 PR OFFICE/OUTPT VISIT, EST, LEVL IV, 30-39 MIN: ICD-10-PCS | Mod: S$PBB,,, | Performed by: FAMILY MEDICINE

## 2022-11-23 PROCEDURE — 99214 OFFICE O/P EST MOD 30 MIN: CPT | Mod: S$PBB,,, | Performed by: FAMILY MEDICINE

## 2022-11-23 PROCEDURE — 36415 PR COLLECTION VENOUS BLOOD,VENIPUNCTURE: ICD-10-PCS | Mod: PN,,, | Performed by: STUDENT IN AN ORGANIZED HEALTH CARE EDUCATION/TRAINING PROGRAM

## 2022-11-23 PROCEDURE — 36415 COLL VENOUS BLD VENIPUNCTURE: CPT | Mod: PN,,, | Performed by: STUDENT IN AN ORGANIZED HEALTH CARE EDUCATION/TRAINING PROGRAM

## 2022-11-23 PROCEDURE — 80061 LIPID PANEL: CPT | Performed by: FAMILY MEDICINE

## 2022-11-23 PROCEDURE — 86200 CCP ANTIBODY: CPT | Performed by: FAMILY MEDICINE

## 2022-11-23 PROCEDURE — 80053 COMPREHEN METABOLIC PANEL: CPT | Performed by: FAMILY MEDICINE

## 2022-11-23 PROCEDURE — 86235 NUCLEAR ANTIGEN ANTIBODY: CPT | Mod: 59 | Performed by: FAMILY MEDICINE

## 2022-11-23 PROCEDURE — 85025 COMPLETE CBC W/AUTO DIFF WBC: CPT | Performed by: FAMILY MEDICINE

## 2022-11-23 PROCEDURE — 84550 ASSAY OF BLOOD/URIC ACID: CPT | Performed by: FAMILY MEDICINE

## 2022-11-23 PROCEDURE — 84443 ASSAY THYROID STIM HORMONE: CPT | Performed by: FAMILY MEDICINE

## 2022-11-23 PROCEDURE — 86038 ANTINUCLEAR ANTIBODIES: CPT | Performed by: FAMILY MEDICINE

## 2022-11-23 PROCEDURE — 84153 ASSAY OF PSA TOTAL: CPT | Performed by: FAMILY MEDICINE

## 2022-11-23 PROCEDURE — 99999 PR PBB SHADOW E&M-EST. PATIENT-LVL III: CPT | Mod: PBBFAC,,, | Performed by: FAMILY MEDICINE

## 2022-11-23 PROCEDURE — 86431 RHEUMATOID FACTOR QUANT: CPT | Performed by: FAMILY MEDICINE

## 2022-11-23 RX ORDER — AMOXICILLIN AND CLAVULANATE POTASSIUM 875; 125 MG/1; MG/1
TABLET, FILM COATED ORAL
COMMUNITY
Start: 2022-06-27 | End: 2023-01-24

## 2022-11-23 RX ORDER — PREDNISONE 20 MG/1
20 TABLET ORAL DAILY
Qty: 10 TABLET | Refills: 0 | Status: SHIPPED | OUTPATIENT
Start: 2022-11-23 | End: 2023-01-24

## 2022-11-23 NOTE — PROGRESS NOTES
Subjective:       Patient ID: Erickson eDcker is a 57 y.o. male.    Chief Complaint: Follow-up (Former pt of Dr. Randhawa and pt would like to establish care. )    Mr Decker is here with right wrist pain (ulnar distribution) for over a year. He state that the pain is mostly in his wrist, but extends to his elbow and down to his 3rd, 4th, and 5th digits. 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 He's had problems ever since that time. He also has tender swollen MCP joints on both hands.  He also needs his labs done and is fasting    Follow-up  Pertinent negatives include no abdominal pain, chest pain, chills, congestion, coughing, diaphoresis, fever, nausea, rash or sore throat.   Review of Systems   Constitutional:  Negative for activity change, appetite change, chills, diaphoresis and fever.   HENT:  Negative for congestion, ear pain, postnasal drip, rhinorrhea and sore throat.    Eyes:  Negative for pain, discharge, redness and itching.   Respiratory:  Negative for cough and shortness of breath.    Cardiovascular:  Negative for chest pain, palpitations and leg swelling.   Gastrointestinal:  Negative for abdominal distention, abdominal pain, constipation, diarrhea and nausea.   Genitourinary:  Negative for difficulty urinating, dysuria, frequency and urgency.   Musculoskeletal:         Left wrist pain and loss of function for almost a year   Skin:  Negative for color change, rash and wound.   All other systems reviewed and are negative.    Patient Active Problem List   Diagnosis    Essential hypertension    Pure hypertriglyceridemia    Multiple joint pain    Prediabetes    Presbyopia    Nicotine dependence    Joint swelling    Peripheral edema       Objective:      Physical Exam  Vitals and nursing note reviewed.   Constitutional:       Appearance: Normal appearance. He is normal weight.   HENT:      Head: Normocephalic.      Nose: Nose normal.  "  Eyes:      Extraocular Movements: Extraocular movements intact.      Conjunctiva/sclera: Conjunctivae normal.      Pupils: Pupils are equal, round, and reactive to light.   Cardiovascular:      Rate and Rhythm: Normal rate and regular rhythm.      Heart sounds: Normal heart sounds. No murmur heard.  Pulmonary:      Effort: Pulmonary effort is normal. No respiratory distress.      Breath sounds: Normal breath sounds.   Musculoskeletal:      Cervical back: Normal range of motion and neck supple.      Comments: Tenderness to right wrist mostly medially, but tender to palpation along ventral side also  MCP joints on both hands are swollen and tender to palpation    Skin:     General: Skin is warm and dry.   Neurological:      General: No focal deficit present.      Mental Status: He is alert and oriented to person, place, and time.   Psychiatric:         Mood and Affect: Mood normal.         Behavior: Behavior normal.       Lab Results   Component Value Date    WBC 7.55 11/24/2021    HGB 13.4 (L) 11/24/2021    HCT 43.0 11/24/2021     11/24/2021    CHOL 141 11/24/2021    TRIG 265 (H) 11/24/2021    HDL 28 (L) 11/24/2021    ALT 26 11/24/2021    AST 19 11/24/2021     11/24/2021    K 4.0 11/24/2021     11/24/2021    CREATININE 0.9 11/24/2021    BUN 16 11/24/2021    CO2 24 11/24/2021    TSH 0.967 11/24/2021    PSA 0.51 11/24/2021    HGBA1C 5.9 (H) 05/23/2022     The 10-year ASCVD risk score (Raghu SOLIS, et al., 2019) is: 11%    Values used to calculate the score:      Age: 57 years      Sex: Male      Is Non- : No      Diabetic: No      Tobacco smoker: No      Systolic Blood Pressure: 140 mmHg      Is BP treated: Yes      HDL Cholesterol: 28 mg/dL      Total Cholesterol: 141 mg/dL  Visit Vitals  BP (!) 140/70 (BP Location: Left arm, Patient Position: Sitting, BP Method: Large (Automatic))   Pulse 91   Temp 98.7 °F (37.1 °C) (Oral)   Ht 5' 10" (1.778 m)   Wt 101.9 kg (224 lb 10.4 oz) "   SpO2 96%   BMI 32.23 kg/m²      Assessment:       1. Pure hypertriglyceridemia    2. Essential hypertension    3. Prediabetes    4. Peripheral edema    5. Joint swelling    6. Screening for prostate cancer    7. Multiple joint pain    8. Bilateral hand pain    9. Fatigue, unspecified type    10. Pain and swelling of right wrist        Plan:       1. Pure hypertriglyceridemia  -     Lipid Panel; Future; Expected date: 11/23/2022    2. Essential hypertension  -     Cancel: Microalbumin/Creatinine Ratio, Urine    3. Prediabetes  -     Cancel: Microalbumin/Creatinine Ratio, Urine  -     CBC Auto Differential; Future; Expected date: 11/23/2022  -     Comprehensive Metabolic Panel; Future; Expected date: 11/23/2022  -     Hemoglobin A1C; Future; Expected date: 11/23/2022    4. Peripheral edema  -     Comprehensive Metabolic Panel; Future; Expected date: 11/23/2022    5. Joint swelling  -     Uric Acid; Future; Expected date: 11/23/2022  -     Cyclic Citrullinated Peptide Antibody, IgG; Future; Expected date: 11/23/2022  -     Rheumatoid Factor; Future; Expected date: 11/23/2022  -     YESENIA Screen w/Reflex; Future; Expected date: 11/23/2022  -     Sedimentation rate; Future; Expected date: 11/23/2022  -     C-Reactive Protein; Future; Expected date: 11/23/2022    6. Screening for prostate cancer  -     PSA, Screening; Future; Expected date: 11/23/2022  -     Comprehensive Metabolic Panel; Future; Expected date: 11/23/2022    7. Multiple joint pain  -     CBC Auto Differential; Future; Expected date: 11/23/2022  -     predniSONE (DELTASONE) 20 MG tablet; Take 1 tablet (20 mg total) by mouth once daily.  Dispense: 10 tablet; Refill: 0    8. Bilateral hand pain  -     Uric Acid; Future; Expected date: 11/23/2022  -     Cyclic Citrullinated Peptide Antibody, IgG; Future; Expected date: 11/23/2022  -     Rheumatoid Factor; Future; Expected date: 11/23/2022  -     YESENIA Screen w/Reflex; Future; Expected date: 11/23/2022  -      Sedimentation rate; Future; Expected date: 11/23/2022  -     C-Reactive Protein; Future; Expected date: 11/23/2022    9. Fatigue, unspecified type  -     TSH; Future; Expected date: 11/23/2022  -     Uric Acid; Future; Expected date: 11/23/2022  -     Cyclic Citrullinated Peptide Antibody, IgG; Future; Expected date: 11/23/2022  -     Rheumatoid Factor; Future; Expected date: 11/23/2022  -     YESENIA Screen w/Reflex; Future; Expected date: 11/23/2022  -     Sedimentation rate; Future; Expected date: 11/23/2022  -     C-Reactive Protein; Future; Expected date: 11/23/2022    10. Pain and swelling of right wrist  -     WRIST BRACE FOR HOME USE     No follow-ups on file.

## 2022-11-24 LAB — CCP AB SER IA-ACNC: <0.5 U/ML

## 2022-11-25 LAB
ANA PATTERN 1: NORMAL
ANA SER QL IF: POSITIVE
ANA TITR SER IF: NORMAL {TITER}

## 2022-11-28 LAB
ANTI SM ANTIBODY: 0.13 RATIO (ref 0–0.99)
ANTI SM/RNP ANTIBODY: 0.13 RATIO (ref 0–0.99)
ANTI-SM INTERPRETATION: NEGATIVE
ANTI-SM/RNP INTERPRETATION: NEGATIVE
ANTI-SSA ANTIBODY: 0.1 RATIO (ref 0–0.99)
ANTI-SSA INTERPRETATION: NEGATIVE
ANTI-SSB ANTIBODY: 0.08 RATIO (ref 0–0.99)
ANTI-SSB INTERPRETATION: NEGATIVE
DSDNA AB SER-ACNC: NORMAL [IU]/ML

## 2022-11-28 NOTE — PROGRESS NOTES
Mr Jeronimo's YESENIA screen was (+). He needs further evaluation by rheumatology to determine IF, and which auto immune condition he may have. A (+) YESENIA is not specific, for a single disorder but is a screening for multiple disorders, including scleroderma, Lupus, Sjogren's syndrome, polymyositis and dermatomyositis, and numerous others. A rheumatologist can get a more specific diagnosis.   Please let us know what rheumatologist you would like to see. Rheumatologists are in short supply, and you may have to travel to [facility] or [facility]. Please let me know what you'd like to do

## 2022-11-29 ENCOUNTER — PATIENT MESSAGE (OUTPATIENT)
Dept: FAMILY MEDICINE | Facility: CLINIC | Age: 57
End: 2022-11-29
Payer: OTHER GOVERNMENT

## 2022-11-29 DIAGNOSIS — M25.50 MULTIPLE JOINT PAIN: Primary | ICD-10-CM

## 2022-11-29 DIAGNOSIS — M25.50 MULTIPLE JOINT PAIN: ICD-10-CM

## 2022-11-29 RX ORDER — DICLOFENAC SODIUM 10 MG/G
GEL TOPICAL
Qty: 100 G | Refills: 2 | Status: SHIPPED | OUTPATIENT
Start: 2022-11-29

## 2022-11-29 NOTE — TELEPHONE ENCOUNTER
Please see pended RX    LOV:11/23/22 Cheo    NOV: 5/23/23 Cheo      Preffered Pharmacy: [id number] - [place], [place] - [address] [facility] [address]    Last Prescribed:  diclofenac sodium (VOLTAREN) 1 % Gel 100 g 2 6/1/2022

## 2023-01-24 ENCOUNTER — OFFICE VISIT (OUTPATIENT)
Dept: FAMILY MEDICINE | Facility: CLINIC | Age: 58
End: 2023-01-24
Payer: OTHER GOVERNMENT

## 2023-01-24 ENCOUNTER — TELEPHONE (OUTPATIENT)
Dept: FAMILY MEDICINE | Facility: CLINIC | Age: 58
End: 2023-01-24
Payer: OTHER GOVERNMENT

## 2023-01-24 VITALS
WEIGHT: 229 LBS | HEART RATE: 84 BPM | DIASTOLIC BLOOD PRESSURE: 80 MMHG | BODY MASS INDEX: 32.06 KG/M2 | SYSTOLIC BLOOD PRESSURE: 138 MMHG | HEIGHT: 71 IN | OXYGEN SATURATION: 97 % | TEMPERATURE: 98 F

## 2023-01-24 DIAGNOSIS — E78.1 PURE HYPERTRIGLYCERIDEMIA: ICD-10-CM

## 2023-01-24 DIAGNOSIS — R94.31 ABNORMAL ELECTROCARDIOGRAM (ECG) (EKG): Primary | ICD-10-CM

## 2023-01-24 PROBLEM — F17.200 NICOTINE DEPENDENCE: Status: RESOLVED | Noted: 2021-11-23 | Resolved: 2023-01-24

## 2023-01-24 PROCEDURE — 99213 PR OFFICE/OUTPT VISIT, EST, LEVL III, 20-29 MIN: ICD-10-PCS | Mod: ,,, | Performed by: FAMILY MEDICINE

## 2023-01-24 PROCEDURE — 99213 OFFICE O/P EST LOW 20 MIN: CPT | Mod: ,,, | Performed by: FAMILY MEDICINE

## 2023-01-24 RX ORDER — FENOFIBRATE 145 MG/1
145 TABLET, FILM COATED ORAL DAILY
Qty: 90 TABLET | Refills: 3 | Status: SHIPPED | OUTPATIENT
Start: 2023-01-24 | End: 2023-05-28 | Stop reason: SDUPTHER

## 2023-01-24 NOTE — TELEPHONE ENCOUNTER
Patient would like to have cholesterol checked due to  protocol the day after his appt on 1/26/2023.

## 2023-01-24 NOTE — PROGRESS NOTES
Subjective:       Patient ID: Erickson Decker is a 57 y.o. male.    Chief Complaint: Follow-up (Patient is wanting to discuss his triglycerides being elevated at 700 per pt; Wants PCP to place order for labs to be done tomorrow morning per  request also. ) and Documentation Only (Patient needs a Medical Summary Form filled out stating he has Hyperlipidemia. See form for advisement and instruction. EKG and Lab Results are attached from patient for PCP to review. )    Patient is here to do a routine physical for work. He has hypertriglyceridemia, HTN  prediabetes, and has QUIT smoking in 2019. His triglycerides were done on 11/23/2022 at [facility] and were 270, He [facility] and his triglycerides were 470..  He was already on fish oil and crestor, we will add tricor. That is a large difference for a few weeks. Also, patient brought an EKG but it was dated June 30, 1997, as if it were a recent EKG.     Follow-up  Pertinent negatives include no abdominal pain, chest pain, chills, congestion, coughing, diaphoresis, fever, nausea, rash or sore throat.   Review of Systems   Constitutional:  Negative for activity change, appetite change, chills, diaphoresis and fever.   HENT:  Negative for congestion, ear pain, postnasal drip, rhinorrhea and sore throat.    Eyes:  Negative for pain, discharge, redness and itching.   Respiratory:  Negative for cough and shortness of breath.    Cardiovascular:  Negative for chest pain, palpitations and leg swelling.   Gastrointestinal:  Negative for abdominal distention, abdominal pain, constipation, diarrhea and nausea.   Genitourinary:  Negative for difficulty urinating, dysuria, frequency and urgency.   Skin:  Negative for color change, rash and wound.   All other systems reviewed and are negative.    Patient Active Problem List   Diagnosis    Essential hypertension    Pure hypertriglyceridemia    Multiple joint pain    Prediabetes     Presbyopia    Joint swelling    Peripheral edema       Objective:      Physical Exam  Vitals and nursing note reviewed.   Constitutional:       Appearance: Normal appearance. He is normal weight.   HENT:      Head: Normocephalic.      Nose: Nose normal.   Eyes:      Extraocular Movements: Extraocular movements intact.      Conjunctiva/sclera: Conjunctivae normal.      Pupils: Pupils are equal, round, and reactive to light.   Cardiovascular:      Rate and Rhythm: Normal rate and regular rhythm.      Heart sounds: Normal heart sounds. No murmur heard.  Pulmonary:      Effort: Pulmonary effort is normal. No respiratory distress.      Breath sounds: Normal breath sounds.   Musculoskeletal:         General: Normal range of motion.      Cervical back: Normal range of motion and neck supple.   Skin:     General: Skin is warm and dry.   Neurological:      General: No focal deficit present.      Mental Status: He is alert and oriented to person, place, and time.   Psychiatric:         Mood and Affect: Mood normal.         Behavior: Behavior normal.       Lab Results   Component Value Date    WBC 7.21 11/23/2022    HGB 14.2 11/23/2022    HCT 44.4 11/23/2022     11/23/2022    CHOL 148 11/23/2022    TRIG 270 (H) 11/23/2022    HDL 30 (L) 11/23/2022    ALT 25 11/23/2022    AST 19 11/23/2022     11/23/2022    K 3.7 11/23/2022     11/23/2022    CREATININE 1.1 11/23/2022    BUN 18 11/23/2022    CO2 28 11/23/2022    TSH 0.976 11/23/2022    PSA 0.44 11/23/2022    HGBA1C 6.1 (H) 11/23/2022     The 10-year ASCVD risk score (Raghu SOLIS, et al., 2019) is: 10.6%    Values used to calculate the score:      Age: 57 years      Sex: Male      Is Non- : No      Diabetic: No      Tobacco smoker: No      Systolic Blood Pressure: 138 mmHg      Is BP treated: Yes      HDL Cholesterol: 30 mg/dL      Total Cholesterol: 148 mg/dL  Visit Vitals  /80 (BP Location: Right arm, Patient Position: Sitting,  "BP Method: Large (Manual))   Pulse 84   Temp 98.3 °F (36.8 °C) (Oral)   Ht 5' 10.5" (1.791 m)   Wt 103.9 kg (229 lb)   SpO2 97%   BMI 32.39 kg/m²      Assessment:       1. Abnormal electrocardiogram (ECG) (EKG)    2. Pure hypertriglyceridemia        Plan:       1. Abnormal electrocardiogram (ECG) (EKG)  -     IN OFFICE EKG 12-LEAD (to Muse)  -     Lipid Panel; Future; Expected date: 01/24/2023    2. Pure hypertriglyceridemia  Assessment & Plan:  Will add tricor for high triglycerides    Orders:  -     IN OFFICE EKG 12-LEAD (to Muse)  -     Lipid Panel; Future; Expected date: 01/24/2023    Other orders  -     fenofibrate (TRICOR) 145 MG tablet; Take 1 tablet (145 mg total) by mouth once daily.  Dispense: 90 tablet; Refill: 3       Follow up in about 6 months (around 7/24/2023).      Future Appointments       Date Provider Specialty Appt Notes    5/23/2023 Darline Grider MD Family Medicine f/u 6 months             "

## 2023-01-25 ENCOUNTER — CLINICAL SUPPORT (OUTPATIENT)
Dept: FAMILY MEDICINE | Facility: CLINIC | Age: 58
End: 2023-01-25
Payer: OTHER GOVERNMENT

## 2023-01-25 ENCOUNTER — LAB VISIT (OUTPATIENT)
Dept: LAB | Facility: CLINIC | Age: 58
End: 2023-01-25
Payer: OTHER GOVERNMENT

## 2023-01-25 DIAGNOSIS — E78.1 PURE HYPERTRIGLYCERIDEMIA: ICD-10-CM

## 2023-01-25 DIAGNOSIS — R94.31 ABNORMAL ELECTROCARDIOGRAM (ECG) (EKG): ICD-10-CM

## 2023-01-25 LAB
CHOLEST SERPL-MCNC: 137 MG/DL (ref 120–199)
CHOLEST/HDLC SERPL: 4.9 {RATIO} (ref 2–5)
HDLC SERPL-MCNC: 28 MG/DL (ref 40–75)
HDLC SERPL: 20.4 % (ref 20–50)
LDLC SERPL CALC-MCNC: 49.2 MG/DL (ref 63–159)
NONHDLC SERPL-MCNC: 109 MG/DL
TRIGL SERPL-MCNC: 299 MG/DL (ref 30–150)

## 2023-01-25 PROCEDURE — 93010 EKG 12-LEAD: ICD-10-PCS | Mod: ,,, | Performed by: INTERNAL MEDICINE

## 2023-01-25 PROCEDURE — 36415 COLL VENOUS BLD VENIPUNCTURE: CPT | Mod: ,,, | Performed by: STUDENT IN AN ORGANIZED HEALTH CARE EDUCATION/TRAINING PROGRAM

## 2023-01-25 PROCEDURE — 80061 LIPID PANEL: CPT | Performed by: FAMILY MEDICINE

## 2023-01-25 PROCEDURE — 93005 EKG 12-LEAD: ICD-10-PCS | Mod: ,,, | Performed by: FAMILY MEDICINE

## 2023-01-25 PROCEDURE — 36415 PR COLLECTION VENOUS BLOOD,VENIPUNCTURE: ICD-10-PCS | Mod: ,,, | Performed by: STUDENT IN AN ORGANIZED HEALTH CARE EDUCATION/TRAINING PROGRAM

## 2023-01-25 PROCEDURE — 93005 ELECTROCARDIOGRAM TRACING: CPT | Mod: ,,, | Performed by: FAMILY MEDICINE

## 2023-01-25 PROCEDURE — 93010 ELECTROCARDIOGRAM REPORT: CPT | Mod: ,,, | Performed by: INTERNAL MEDICINE

## 2023-01-26 ENCOUNTER — OFFICE VISIT (OUTPATIENT)
Dept: FAMILY MEDICINE | Facility: CLINIC | Age: 58
End: 2023-01-26
Payer: OTHER GOVERNMENT

## 2023-01-26 ENCOUNTER — TELEPHONE (OUTPATIENT)
Dept: FAMILY MEDICINE | Facility: CLINIC | Age: 58
End: 2023-01-26

## 2023-01-26 VITALS
HEIGHT: 70 IN | WEIGHT: 229 LBS | HEART RATE: 88 BPM | OXYGEN SATURATION: 97 % | TEMPERATURE: 98 F | BODY MASS INDEX: 32.78 KG/M2 | SYSTOLIC BLOOD PRESSURE: 128 MMHG | DIASTOLIC BLOOD PRESSURE: 80 MMHG

## 2023-01-26 DIAGNOSIS — M25.541 ARTHRALGIA OF RIGHT HAND: Primary | ICD-10-CM

## 2023-01-26 DIAGNOSIS — M25.40 JOINT SWELLING: ICD-10-CM

## 2023-01-26 PROCEDURE — 99212 PR OFFICE/OUTPT VISIT, EST, LEVL II, 10-19 MIN: ICD-10-PCS | Mod: ,,, | Performed by: FAMILY MEDICINE

## 2023-01-26 PROCEDURE — 99212 OFFICE O/P EST SF 10 MIN: CPT | Mod: ,,, | Performed by: FAMILY MEDICINE

## 2023-01-26 NOTE — PROGRESS NOTES
Subjective:       Patient ID: Erickson Decker is a 57 y.o. male.    Chief Complaint: Follow-up (Patient is here in office to review and discuss lab results from previous bloodwork drawn.  )    Mr Decker is here to review labs and to have paperwork filled out for work. He also needs a rheumatology referral    Follow-up  Associated symptoms include arthralgias and myalgias.   Review of Systems   Musculoskeletal:  Positive for arthralgias, back pain and myalgias.     Patient Active Problem List   Diagnosis    Essential hypertension    Pure hypertriglyceridemia    Multiple joint pain    Prediabetes    Presbyopia    Joint swelling    Peripheral edema       Objective:      Physical Exam  Constitutional:       Appearance: Normal appearance.   HENT:      Mouth/Throat:      Mouth: Mucous membranes are moist.   Eyes:      Extraocular Movements: Extraocular movements intact.      Pupils: Pupils are equal, round, and reactive to light.   Abdominal:      Palpations: Abdomen is soft.   Musculoskeletal:         General: Normal range of motion.   Skin:     General: Skin is warm.   Neurological:      Mental Status: He is alert.   Psychiatric:         Mood and Affect: Mood normal.         Behavior: Behavior normal.         Thought Content: Thought content normal.         Judgment: Judgment normal.       Lab Results   Component Value Date    WBC 7.21 11/23/2022    HGB 14.2 11/23/2022    HCT 44.4 11/23/2022     11/23/2022    CHOL 137 01/25/2023    TRIG 299 (H) 01/25/2023    HDL 28 (L) 01/25/2023    ALT 25 11/23/2022    AST 19 11/23/2022     11/23/2022    K 3.7 11/23/2022     11/23/2022    CREATININE 1.1 11/23/2022    BUN 18 11/23/2022    CO2 28 11/23/2022    TSH 0.976 11/23/2022    PSA 0.44 11/23/2022    HGBA1C 6.1 (H) 11/23/2022     The 10-year ASCVD risk score (Raghu SOLIS, et al., 2019) is: 9.1%    Values used to calculate the score:      Age: 57 years      Sex: Male      Is Non- : No       "Diabetic: No      Tobacco smoker: No      Systolic Blood Pressure: 128 mmHg      Is BP treated: Yes      HDL Cholesterol: 28 mg/dL      Total Cholesterol: 137 mg/dL  Visit Vitals  /80 (BP Location: Left arm, Patient Position: Sitting, BP Method: Large (Manual))   Pulse 88   Temp 98.2 °F (36.8 °C) (Oral)   Ht 5' 10" (1.778 m)   Wt 103.9 kg (229 lb)   SpO2 97%   BMI 32.86 kg/m²      Assessment:       1. Arthralgia of right hand    2. Joint swelling          Plan:       1. Arthralgia of right hand  -     Ambulatory referral/consult to Rheumatology; Future; Expected date: 02/02/2023    2. Joint swelling  -     Ambulatory referral/consult to Rheumatology; Future; Expected date: 02/02/2023       Follow up if symptoms worsen or fail to improve.      Future Appointments       Date Provider Specialty Appt Notes    5/23/2023 Darline Grider MD Family Medicine f/u 6 months    7/27/2023 Darline Grider MD Family Medicine 6 month follow up             "

## 2023-05-23 ENCOUNTER — OFFICE VISIT (OUTPATIENT)
Dept: FAMILY MEDICINE | Facility: CLINIC | Age: 58
End: 2023-05-23
Payer: OTHER GOVERNMENT

## 2023-05-23 VITALS
DIASTOLIC BLOOD PRESSURE: 80 MMHG | TEMPERATURE: 98 F | OXYGEN SATURATION: 98 % | WEIGHT: 227.75 LBS | HEIGHT: 70 IN | BODY MASS INDEX: 32.61 KG/M2 | SYSTOLIC BLOOD PRESSURE: 130 MMHG | HEART RATE: 70 BPM

## 2023-05-23 DIAGNOSIS — Z12.5 SCREENING FOR PROSTATE CANCER: ICD-10-CM

## 2023-05-23 DIAGNOSIS — L98.9 SKIN LESIONS: ICD-10-CM

## 2023-05-23 DIAGNOSIS — R53.83 FATIGUE, UNSPECIFIED TYPE: ICD-10-CM

## 2023-05-23 DIAGNOSIS — E78.1 PURE HYPERTRIGLYCERIDEMIA: Primary | ICD-10-CM

## 2023-05-23 DIAGNOSIS — R73.03 PREDIABETES: ICD-10-CM

## 2023-05-23 DIAGNOSIS — I10 ESSENTIAL HYPERTENSION: ICD-10-CM

## 2023-05-23 PROCEDURE — 99214 PR OFFICE/OUTPT VISIT, EST, LEVL IV, 30-39 MIN: ICD-10-PCS | Mod: ,,, | Performed by: FAMILY MEDICINE

## 2023-05-23 PROCEDURE — 99214 OFFICE O/P EST MOD 30 MIN: CPT | Mod: ,,, | Performed by: FAMILY MEDICINE

## 2023-05-23 NOTE — PROGRESS NOTES
Subjective:       Patient ID: Erickson Decker is a 57 y.o. male.    Chief Complaint: Follow-up and Hyperlipidemia (Pt here for 3 month ck up..still having right hand pain/Also noticed lesions on left arm and back area)    Your Jeronimo is a 57-year-old male who is here for his routine follow-up for hypertension, hypertriglyceridemia, prediabetes, chronic joint issues in he does need labs.  His last labs were done on 11/23//2022 and he had a repeat cholesterol done on 01/25/2023.  These labs have been reviewed order routine labs to be done at his earliest convenience.  He also has a new problem with his right hand..his 4th digit is drifting medially. He has no control over the drift.     Follow-up  Pertinent negatives include no abdominal pain, chest pain, chills, congestion, coughing, diaphoresis, fever, nausea, rash or sore throat.   Hyperlipidemia  Pertinent negatives include no chest pain or shortness of breath.   Review of Systems   Constitutional:  Negative for activity change, appetite change, chills, diaphoresis and fever.   HENT:  Negative for congestion, ear pain, postnasal drip, rhinorrhea and sore throat.    Eyes:  Negative for pain, discharge, redness and itching.   Respiratory:  Negative for cough and shortness of breath.    Cardiovascular:  Negative for chest pain, palpitations and leg swelling.   Gastrointestinal:  Negative for abdominal distention, abdominal pain, constipation, diarrhea and nausea.   Genitourinary:  Negative for difficulty urinating, dysuria, frequency and urgency.   Musculoskeletal:         Right 4th digit with medially drift.   Skin:  Negative for color change, rash and wound.        Small skin lesions that have popped up over the past few weeks to 1 months ago. The lesions are hard, then the skin dries up and sloughs off.    Neurological:  Negative for speech difficulty.   Psychiatric/Behavioral:  Negative for dysphoric mood. The patient is not nervous/anxious.    All other  "systems reviewed and are negative.    Patient Active Problem List   Diagnosis    Essential hypertension    Pure hypertriglyceridemia    Multiple joint pain    Prediabetes    Presbyopia    Joint swelling    Peripheral edema       Objective:      Physical Exam  Constitutional:       Appearance: Normal appearance. He is obese.   HENT:      Head: Normocephalic and atraumatic.      Nose: Nose normal.      Mouth/Throat:      Mouth: Mucous membranes are moist.   Eyes:      Extraocular Movements: Extraocular movements intact.      Pupils: Pupils are equal, round, and reactive to light.   Cardiovascular:      Rate and Rhythm: Normal rate and regular rhythm.   Pulmonary:      Effort: Pulmonary effort is normal.      Breath sounds: Normal breath sounds.   Musculoskeletal:      Comments: Right 4th digit with medial drift   Skin:     Comments: Small, flaking skin lesions to extremities.   Neurological:      Mental Status: He is alert.       Lab Results   Component Value Date    WBC 7.21 11/23/2022    HGB 14.2 11/23/2022    HCT 44.4 11/23/2022     11/23/2022    CHOL 137 01/25/2023    TRIG 299 (H) 01/25/2023    HDL 28 (L) 01/25/2023    ALT 25 11/23/2022    AST 19 11/23/2022     11/23/2022    K 3.7 11/23/2022     11/23/2022    CREATININE 1.1 11/23/2022    BUN 18 11/23/2022    CO2 28 11/23/2022    TSH 0.976 11/23/2022    PSA 0.44 11/23/2022    HGBA1C 6.1 (H) 11/23/2022     The 10-year ASCVD risk score (Raghu DK, et al., 2019) is: 9.4%    Values used to calculate the score:      Age: 57 years      Sex: Male      Is Non- : No      Diabetic: No      Tobacco smoker: No      Systolic Blood Pressure: 130 mmHg      Is BP treated: Yes      HDL Cholesterol: 28 mg/dL      Total Cholesterol: 137 mg/dL  Visit Vitals  /80 (BP Location: Right arm, Patient Position: Sitting, BP Method: Large (Manual))   Pulse 70   Temp 98 °F (36.7 °C)   Ht 5' 10" (1.778 m)   Wt 103.3 kg (227 lb 11.8 oz)   SpO2 98% "   BMI 32.68 kg/m²      Assessment:       1. Pure hypertriglyceridemia    2. Essential hypertension    3. Prediabetes    4. Fatigue, unspecified type    5. Screening for prostate cancer    6. Skin lesions        Plan:       1. Pure hypertriglyceridemia  -     Lipid Panel; Future; Expected date: 05/23/2023    2. Essential hypertension  -     CBC Auto Differential; Future; Expected date: 05/23/2023  -     Comprehensive Metabolic Panel; Future; Expected date: 05/23/2023  -     Microalbumin/Creatinine Ratio, Urine; Future; Expected date: 05/23/2023    3. Prediabetes  -     Hemoglobin A1C; Future; Expected date: 05/23/2023  -     TSH; Future; Expected date: 05/23/2023  -     Microalbumin/Creatinine Ratio, Urine; Future; Expected date: 05/23/2023    4. Fatigue, unspecified type  -     TSH; Future; Expected date: 05/23/2023    5. Screening for prostate cancer  -     PSA, Screening; Future; Expected date: 05/23/2023    6. Skin lesions  -     Ambulatory referral/consult to Dermatology; Future; Expected date: 05/30/2023       Follow up in about 6 months (around 11/23/2023), or if symptoms worsen or fail to improve.      Future Appointments       Date Provider Specialty Appt Notes    7/27/2023 Darline Grider MD Family Medicine 6 month follow up

## 2023-05-24 ENCOUNTER — LAB VISIT (OUTPATIENT)
Dept: LAB | Facility: CLINIC | Age: 58
End: 2023-05-24
Payer: OTHER GOVERNMENT

## 2023-05-24 ENCOUNTER — TELEPHONE (OUTPATIENT)
Dept: DERMATOLOGY | Facility: CLINIC | Age: 58
End: 2023-05-24
Payer: OTHER GOVERNMENT

## 2023-05-24 DIAGNOSIS — I10 ESSENTIAL HYPERTENSION: ICD-10-CM

## 2023-05-24 DIAGNOSIS — R53.83 FATIGUE, UNSPECIFIED TYPE: ICD-10-CM

## 2023-05-24 DIAGNOSIS — E78.1 PURE HYPERTRIGLYCERIDEMIA: ICD-10-CM

## 2023-05-24 DIAGNOSIS — Z12.5 SCREENING FOR PROSTATE CANCER: ICD-10-CM

## 2023-05-24 DIAGNOSIS — R73.03 PREDIABETES: ICD-10-CM

## 2023-05-24 LAB
ALBUMIN SERPL BCP-MCNC: 4.1 G/DL (ref 3.5–5.2)
ALBUMIN/CREAT UR: 5.7 UG/MG (ref 0–30)
ALP SERPL-CCNC: 70 U/L (ref 55–135)
ALT SERPL W/O P-5'-P-CCNC: 27 U/L (ref 10–44)
ANION GAP SERPL CALC-SCNC: 8 MMOL/L (ref 8–16)
AST SERPL-CCNC: 21 U/L (ref 10–40)
BASOPHILS # BLD AUTO: 0.06 K/UL (ref 0–0.2)
BASOPHILS NFR BLD: 0.9 % (ref 0–1.9)
BILIRUB SERPL-MCNC: 0.3 MG/DL (ref 0.1–1)
BUN SERPL-MCNC: 18 MG/DL (ref 6–20)
CALCIUM SERPL-MCNC: 9.6 MG/DL (ref 8.7–10.5)
CHLORIDE SERPL-SCNC: 106 MMOL/L (ref 95–110)
CHOLEST SERPL-MCNC: 117 MG/DL (ref 120–199)
CHOLEST/HDLC SERPL: 4.5 {RATIO} (ref 2–5)
CO2 SERPL-SCNC: 25 MMOL/L (ref 23–29)
COMPLEXED PSA SERPL-MCNC: 0.32 NG/ML (ref 0–4)
CREAT SERPL-MCNC: 1.1 MG/DL (ref 0.5–1.4)
CREAT UR-MCNC: 193 MG/DL (ref 23–375)
DIFFERENTIAL METHOD: ABNORMAL
EOSINOPHIL # BLD AUTO: 0.3 K/UL (ref 0–0.5)
EOSINOPHIL NFR BLD: 4.6 % (ref 0–8)
ERYTHROCYTE [DISTWIDTH] IN BLOOD BY AUTOMATED COUNT: 14.4 % (ref 11.5–14.5)
EST. GFR  (NO RACE VARIABLE): >60 ML/MIN/1.73 M^2
ESTIMATED AVG GLUCOSE: 123 MG/DL (ref 68–131)
GLUCOSE SERPL-MCNC: 107 MG/DL (ref 70–110)
HBA1C MFR BLD: 5.9 % (ref 4–5.6)
HCT VFR BLD AUTO: 41.5 % (ref 40–54)
HDLC SERPL-MCNC: 26 MG/DL (ref 40–75)
HDLC SERPL: 22.2 % (ref 20–50)
HGB BLD-MCNC: 13.3 G/DL (ref 14–18)
IMM GRANULOCYTES # BLD AUTO: 0.01 K/UL (ref 0–0.04)
IMM GRANULOCYTES NFR BLD AUTO: 0.2 % (ref 0–0.5)
LDLC SERPL CALC-MCNC: 48.8 MG/DL (ref 63–159)
LYMPHOCYTES # BLD AUTO: 2.4 K/UL (ref 1–4.8)
LYMPHOCYTES NFR BLD: 36.5 % (ref 18–48)
MCH RBC QN AUTO: 28.2 PG (ref 27–31)
MCHC RBC AUTO-ENTMCNC: 32 G/DL (ref 32–36)
MCV RBC AUTO: 88 FL (ref 82–98)
MICROALBUMIN UR DL<=1MG/L-MCNC: 11 UG/ML
MONOCYTES # BLD AUTO: 0.5 K/UL (ref 0.3–1)
MONOCYTES NFR BLD: 7.4 % (ref 4–15)
NEUTROPHILS # BLD AUTO: 3.3 K/UL (ref 1.8–7.7)
NEUTROPHILS NFR BLD: 50.4 % (ref 38–73)
NONHDLC SERPL-MCNC: 91 MG/DL
NRBC BLD-RTO: 0 /100 WBC
PLATELET # BLD AUTO: 293 K/UL (ref 150–450)
PMV BLD AUTO: 10.5 FL (ref 9.2–12.9)
POTASSIUM SERPL-SCNC: 3.7 MMOL/L (ref 3.5–5.1)
PROT SERPL-MCNC: 7.9 G/DL (ref 6–8.4)
RBC # BLD AUTO: 4.72 M/UL (ref 4.6–6.2)
SODIUM SERPL-SCNC: 139 MMOL/L (ref 136–145)
TRIGL SERPL-MCNC: 211 MG/DL (ref 30–150)
TSH SERPL DL<=0.005 MIU/L-ACNC: 1.92 UIU/ML (ref 0.4–4)
WBC # BLD AUTO: 6.58 K/UL (ref 3.9–12.7)

## 2023-05-24 PROCEDURE — 80061 LIPID PANEL: CPT | Performed by: FAMILY MEDICINE

## 2023-05-24 PROCEDURE — 80053 COMPREHEN METABOLIC PANEL: CPT | Performed by: FAMILY MEDICINE

## 2023-05-24 PROCEDURE — 84153 ASSAY OF PSA TOTAL: CPT | Performed by: FAMILY MEDICINE

## 2023-05-24 PROCEDURE — 84443 ASSAY THYROID STIM HORMONE: CPT | Performed by: FAMILY MEDICINE

## 2023-05-24 PROCEDURE — 85025 COMPLETE CBC W/AUTO DIFF WBC: CPT | Performed by: FAMILY MEDICINE

## 2023-05-24 PROCEDURE — 83036 HEMOGLOBIN GLYCOSYLATED A1C: CPT | Performed by: FAMILY MEDICINE

## 2023-05-24 PROCEDURE — 82570 ASSAY OF URINE CREATININE: CPT | Performed by: FAMILY MEDICINE

## 2023-05-29 DIAGNOSIS — E78.1 PURE HYPERTRIGLYCERIDEMIA: ICD-10-CM

## 2023-05-29 DIAGNOSIS — M25.50 MULTIPLE JOINT PAIN: ICD-10-CM

## 2023-05-29 DIAGNOSIS — I10 ESSENTIAL HYPERTENSION: ICD-10-CM

## 2023-05-29 RX ORDER — FENOFIBRATE 145 MG/1
145 TABLET, FILM COATED ORAL DAILY
Qty: 90 TABLET | Refills: 3 | Status: SHIPPED | OUTPATIENT
Start: 2023-05-29 | End: 2023-11-18 | Stop reason: SDUPTHER

## 2023-05-29 RX ORDER — ROSUVASTATIN CALCIUM 20 MG/1
20 TABLET, COATED ORAL DAILY
Qty: 90 TABLET | Refills: 3 | Status: SHIPPED | OUTPATIENT
Start: 2023-05-29 | End: 2023-11-19

## 2023-05-29 RX ORDER — HYDROCHLOROTHIAZIDE 25 MG/1
25 TABLET ORAL DAILY
Qty: 90 TABLET | Refills: 3 | Status: SHIPPED | OUTPATIENT
Start: 2023-05-29 | End: 2023-11-20

## 2023-05-29 RX ORDER — MELOXICAM 15 MG/1
15 TABLET ORAL DAILY
Qty: 90 TABLET | Refills: 3 | Status: SHIPPED | OUTPATIENT
Start: 2023-05-29 | End: 2023-11-20

## 2023-05-29 NOTE — TELEPHONE ENCOUNTER
LOV:5/23/23 Cheo    NOV:11/14/23 Cheo      Preffered Pharmacy:   [facility] - [facility] [address] [place] [address]

## 2023-05-29 NOTE — TELEPHONE ENCOUNTER
LOV: 5/23/23 Cheo    NOV:  11/14/23 Cheo    Preffered Pharmacy:   [facility] - [facility] [address] [place] [address]

## 2023-11-14 ENCOUNTER — LAB VISIT (OUTPATIENT)
Dept: LAB | Facility: HOSPITAL | Age: 58
End: 2023-11-14
Attending: FAMILY MEDICINE
Payer: OTHER GOVERNMENT

## 2023-11-14 ENCOUNTER — OFFICE VISIT (OUTPATIENT)
Dept: FAMILY MEDICINE | Facility: CLINIC | Age: 58
End: 2023-11-14
Payer: OTHER GOVERNMENT

## 2023-11-14 VITALS
RESPIRATION RATE: 20 BRPM | HEIGHT: 70 IN | DIASTOLIC BLOOD PRESSURE: 80 MMHG | BODY MASS INDEX: 33.41 KG/M2 | OXYGEN SATURATION: 97 % | SYSTOLIC BLOOD PRESSURE: 144 MMHG | WEIGHT: 233.38 LBS | HEART RATE: 75 BPM

## 2023-11-14 DIAGNOSIS — R60.0 PERIPHERAL EDEMA: ICD-10-CM

## 2023-11-14 DIAGNOSIS — R73.03 PREDIABETES: ICD-10-CM

## 2023-11-14 DIAGNOSIS — E78.1 PURE HYPERTRIGLYCERIDEMIA: ICD-10-CM

## 2023-11-14 DIAGNOSIS — I10 ESSENTIAL HYPERTENSION: Primary | ICD-10-CM

## 2023-11-14 DIAGNOSIS — I10 ESSENTIAL HYPERTENSION: ICD-10-CM

## 2023-11-14 LAB
ALBUMIN/CREAT UR: 9.9 UG/MG (ref 0–30)
CREAT UR-MCNC: 111 MG/DL (ref 23–375)
MICROALBUMIN UR DL<=1MG/L-MCNC: 11 UG/ML

## 2023-11-14 PROCEDURE — 99999 PR PBB SHADOW E&M-EST. PATIENT-LVL III: ICD-10-PCS | Mod: PBBFAC,,, | Performed by: FAMILY MEDICINE

## 2023-11-14 PROCEDURE — 99214 OFFICE O/P EST MOD 30 MIN: CPT | Mod: S$PBB,,, | Performed by: FAMILY MEDICINE

## 2023-11-14 PROCEDURE — 99214 PR OFFICE/OUTPT VISIT, EST, LEVL IV, 30-39 MIN: ICD-10-PCS | Mod: S$PBB,,, | Performed by: FAMILY MEDICINE

## 2023-11-14 PROCEDURE — 99999 PR PBB SHADOW E&M-EST. PATIENT-LVL III: CPT | Mod: PBBFAC,,, | Performed by: FAMILY MEDICINE

## 2023-11-14 PROCEDURE — 82043 UR ALBUMIN QUANTITATIVE: CPT | Performed by: FAMILY MEDICINE

## 2023-11-14 PROCEDURE — 99213 OFFICE O/P EST LOW 20 MIN: CPT | Mod: PBBFAC | Performed by: FAMILY MEDICINE

## 2023-11-14 NOTE — PROGRESS NOTES
Subjective:       Patient ID: Erickson Decker is a 58 y.o. male.    Chief Complaint: Follow-up    Mr Decker is a 59 yo male her for his 6 month follow up for HTN, hypertriglyceridemia, prediabetes, joint pain, and joint swelling with peripheral edema.    Follow-up  Pertinent negatives include no abdominal pain, chest pain, chills, congestion, coughing, diaphoresis, fever, nausea, rash or sore throat.     Review of Systems   Constitutional:  Negative for activity change, appetite change, chills, diaphoresis and fever.        Weight gain of 9#   HENT:  Negative for congestion, ear pain, postnasal drip, rhinorrhea and sore throat.    Eyes:  Negative for pain, discharge, redness and itching.   Respiratory:  Negative for cough and shortness of breath.    Cardiovascular:  Negative for chest pain, palpitations and leg swelling.   Gastrointestinal:  Negative for abdominal distention, abdominal pain, constipation, diarrhea and nausea.   Genitourinary:  Negative for difficulty urinating, dysuria, frequency and urgency.   Skin:  Negative for color change, rash and wound.        Chronic dermatitis, has a dermatology appointment on 12/11/2023   All other systems reviewed and are negative.      Patient Active Problem List   Diagnosis    Essential hypertension    Pure hypertriglyceridemia    Multiple joint pain    Prediabetes    Presbyopia    Joint swelling    Peripheral edema       Objective:      Physical Exam  Vitals and nursing note reviewed.   Constitutional:       Appearance: Normal appearance. He is normal weight.   HENT:      Head: Normocephalic.      Nose: Nose normal.   Eyes:      Extraocular Movements: Extraocular movements intact.      Conjunctiva/sclera: Conjunctivae normal.      Pupils: Pupils are equal, round, and reactive to light.   Cardiovascular:      Rate and Rhythm: Normal rate and regular rhythm.      Heart sounds: Normal heart sounds. No murmur heard.  Pulmonary:      Effort: Pulmonary effort is normal. No  "respiratory distress.      Breath sounds: Normal breath sounds.   Musculoskeletal:         General: Normal range of motion.      Cervical back: Normal range of motion and neck supple.   Skin:     General: Skin is warm and dry.   Neurological:      General: No focal deficit present.      Mental Status: He is alert and oriented to person, place, and time.   Psychiatric:         Mood and Affect: Mood normal.         Behavior: Behavior normal.         Lab Results   Component Value Date    WBC 6.58 05/24/2023    HGB 13.3 (L) 05/24/2023    HCT 41.5 05/24/2023     05/24/2023    CHOL 117 (L) 05/24/2023    TRIG 211 (H) 05/24/2023    HDL 26 (L) 05/24/2023    ALT 27 05/24/2023    AST 21 05/24/2023     05/24/2023    K 3.7 05/24/2023     05/24/2023    CREATININE 1.1 05/24/2023    BUN 18 05/24/2023    CO2 25 05/24/2023    TSH 1.918 05/24/2023    PSA 0.32 05/24/2023    HGBA1C 5.9 (H) 05/24/2023     The ASCVD Risk score (Raghu SOLIS, et al., 2019) failed to calculate for the following reasons:    The valid total cholesterol range is 130 to 320 mg/dL  Visit Vitals  BP (!) 144/80 (BP Location: Right arm, Patient Position: Sitting, BP Method: Large (Manual))   Pulse 75   Resp 20   Ht 5' 10" (1.778 m)   Wt 105.9 kg (233 lb 6.4 oz)   SpO2 97%   BMI 33.49 kg/m²      Assessment:       1. Essential hypertension    2. Pure hypertriglyceridemia    3. Peripheral edema    4. Prediabetes        Plan:       1. Essential hypertension  Assessment & Plan:  Stable (slightly elevated today)    Orders:  -     CBC Auto Differential; Future; Expected date: 11/14/2023  -     Comprehensive Metabolic Panel; Future; Expected date: 11/14/2023  -     Microalbumin/Creatinine Ratio, Urine; Future; Expected date: 11/14/2023    2. Pure hypertriglyceridemia  Assessment & Plan:  On tricor    Orders:  -     Lipid Panel; Future; Expected date: 11/14/2023    3. Peripheral edema  Assessment & Plan:  On HCTZ, stable      4. Prediabetes  -     Hemoglobin " A1C; Future; Expected date: 11/14/2023       Follow up in about 6 months (around 5/14/2024), or if symptoms worsen or fail to improve.      Future Appointments       Date Provider Specialty Appt Notes    12/11/2023 Amber Diaz MD Dermatology Rash

## 2023-11-15 ENCOUNTER — TELEPHONE (OUTPATIENT)
Dept: FAMILY MEDICINE | Facility: CLINIC | Age: 58
End: 2023-11-15
Payer: OTHER GOVERNMENT

## 2023-11-19 DIAGNOSIS — I10 ESSENTIAL HYPERTENSION: ICD-10-CM

## 2023-11-19 DIAGNOSIS — M25.50 MULTIPLE JOINT PAIN: ICD-10-CM

## 2023-11-19 DIAGNOSIS — E78.1 PURE HYPERTRIGLYCERIDEMIA: ICD-10-CM

## 2023-11-19 RX ORDER — ROSUVASTATIN CALCIUM 20 MG/1
20 TABLET, COATED ORAL
Qty: 90 TABLET | Refills: 3 | Status: SHIPPED | OUTPATIENT
Start: 2023-11-19

## 2023-11-19 NOTE — TELEPHONE ENCOUNTER
Refill Routing Note   Medication(s) are not appropriate for processing by [facility] for the following reason(s):      Medication outside of protocol  Required vitals abnormal    [facility] action(s):  Approve  Defer  Route Care Due:  None identified            Appointments  past 12m or future 3m with PCP    Date Provider   Last Visit   Visit date not found Ria Pedraza MD   Next Visit   Visit date not found Ria Pedraza MD   ED visits in past 90 days: 0        Note composed:2:44 PM 11/19/2023

## 2023-11-20 RX ORDER — FENOFIBRATE 145 MG/1
145 TABLET, FILM COATED ORAL DAILY
Qty: 90 TABLET | Refills: 3 | Status: SHIPPED | OUTPATIENT
Start: 2023-11-20 | End: 2024-11-19

## 2023-11-20 RX ORDER — MELOXICAM 15 MG/1
15 TABLET ORAL
Qty: 90 TABLET | Refills: 3 | Status: SHIPPED | OUTPATIENT
Start: 2023-11-20

## 2023-11-20 RX ORDER — HYDROCHLOROTHIAZIDE 25 MG/1
25 TABLET ORAL
Qty: 90 TABLET | Refills: 3 | Status: SHIPPED | OUTPATIENT
Start: 2023-11-20

## 2023-11-20 NOTE — TELEPHONE ENCOUNTER
LOV: 11/14/23 w/ Dr. Grider   NOV: 5/14/24 w/ Dr. Grider     Patient's fenofibrate prescription was submitted 5/29/23 and was submitted for 1 year supply, however it was submitted to [email or web address].     Patient indicates that [facility] is out of this prescription again, requiring a new prescription to be submitted to local pharmacy instead. Patient is requesting this to be sent to [facility] pharmacy.

## 2023-12-11 ENCOUNTER — OFFICE VISIT (OUTPATIENT)
Dept: DERMATOLOGY | Facility: CLINIC | Age: 58
End: 2023-12-11
Payer: OTHER GOVERNMENT

## 2023-12-11 VITALS — BODY MASS INDEX: 32.21 KG/M2 | WEIGHT: 225 LBS | HEIGHT: 70 IN

## 2023-12-11 DIAGNOSIS — L98.9 DISEASE OF SKIN AND SUBCUTANEOUS TISSUE: Primary | ICD-10-CM

## 2023-12-11 DIAGNOSIS — M25.50 ARTHRALGIA, UNSPECIFIED JOINT: ICD-10-CM

## 2023-12-11 DIAGNOSIS — L98.9 SKIN LESIONS: ICD-10-CM

## 2023-12-11 DIAGNOSIS — L82.1 SEBORRHEIC KERATOSES: ICD-10-CM

## 2023-12-11 DIAGNOSIS — L40.8 SEBOPSORIASIS: ICD-10-CM

## 2023-12-11 PROCEDURE — 88312 PR  SPECIAL STAINS,GROUP I: ICD-10-PCS | Mod: 26,,, | Performed by: PATHOLOGY

## 2023-12-11 PROCEDURE — 88305 TISSUE EXAM BY PATHOLOGIST: CPT | Performed by: PATHOLOGY

## 2023-12-11 PROCEDURE — 11102 PR TANGENTIAL BIOPSY, SKIN, SINGLE LESION: ICD-10-PCS | Mod: S$GLB,,, | Performed by: DERMATOLOGY

## 2023-12-11 PROCEDURE — 88312 SPECIAL STAINS GROUP 1: CPT | Mod: 26,,, | Performed by: PATHOLOGY

## 2023-12-11 PROCEDURE — 99204 OFFICE O/P NEW MOD 45 MIN: CPT | Mod: 25,S$GLB,, | Performed by: DERMATOLOGY

## 2023-12-11 PROCEDURE — 88305 TISSUE EXAM BY PATHOLOGIST: CPT | Mod: 26,,, | Performed by: PATHOLOGY

## 2023-12-11 PROCEDURE — 88305 TISSUE EXAM BY PATHOLOGIST: ICD-10-PCS | Mod: 26,,, | Performed by: PATHOLOGY

## 2023-12-11 PROCEDURE — 11102 TANGNTL BX SKIN SINGLE LES: CPT | Mod: S$GLB,,, | Performed by: DERMATOLOGY

## 2023-12-11 PROCEDURE — 99204 PR OFFICE/OUTPT VISIT, NEW, LEVL IV, 45-59 MIN: ICD-10-PCS | Mod: 25,S$GLB,, | Performed by: DERMATOLOGY

## 2023-12-11 PROCEDURE — 88342 IMHCHEM/IMCYTCHM 1ST ANTB: CPT | Performed by: PATHOLOGY

## 2023-12-11 RX ORDER — HYDROCORTISONE 25 MG/ML
LOTION TOPICAL
Qty: 59 ML | Refills: 2 | Status: SHIPPED | OUTPATIENT
Start: 2023-12-11 | End: 2023-12-17 | Stop reason: SDUPTHER

## 2023-12-11 RX ORDER — CLOBETASOL PROPIONATE 0.5 MG/G
OINTMENT TOPICAL
Qty: 60 G | Refills: 1 | Status: SHIPPED | OUTPATIENT
Start: 2023-12-11

## 2023-12-11 RX ORDER — SULFACETAMIDE SODIUM, SULFUR 100; 50 MG/G; MG/G
EMULSION TOPICAL
Qty: 170 G | Refills: 5 | Status: SHIPPED | OUTPATIENT
Start: 2023-12-11

## 2023-12-11 RX ORDER — KETOCONAZOLE 20 MG/ML
SHAMPOO, SUSPENSION TOPICAL
Qty: 120 ML | Refills: 5 | Status: SHIPPED | OUTPATIENT
Start: 2023-12-11

## 2023-12-11 NOTE — PROGRESS NOTES
Subjective:      Patient ID:  Erickson Decker is a 58 y.o. male who presents for   Chief Complaint   Patient presents with    Skin Check     UBSC     New Patient    Patient here today for UBSC  C/O spot to back x 2 years. Won't heal.  C/O spot to left arm x recently noticed, no symptoms.   C/O dry areas to nose and posterior ear, slightly painful.     Derm Hx:  Denies Phx of NMSC  Denies Fhx of MM    Current Outpatient Medications:   ·  diclofenac sodium (VOLTAREN) 1 % Gel, APPLY 2 GRAMS TOPICALLY TO THE AFFECTED AREA FOUR TIMES DAILY, Disp: 100 g, Rfl: 2  ·  fenofibrate (TRICOR) 145 MG tablet, Take 1 tablet (145 mg total) by mouth once daily., Disp: 90 tablet, Rfl: 3  ·  hydroCHLOROthiazide (HYDRODIURIL) 25 MG tablet, TAKE 1 TABLET(25 MG) BY MOUTH EVERY DAY, Disp: 90 tablet, Rfl: 3  ·  meloxicam (MOBIC) 15 MG tablet, TAKE 1 TABLET(15 MG) BY MOUTH EVERY DAY, Disp: 90 tablet, Rfl: 3  ·  omega-3 fatty acids/fish oil (FISH OIL-OMEGA-3 FATTY ACIDS) 300-1,000 mg capsule, Take by mouth once daily., Disp: , Rfl:   ·  rosuvastatin (CRESTOR) 20 MG tablet, TAKE 1 TABLET(20 MG) BY MOUTH EVERY DAY, Disp: 90 tablet, Rfl: 3        Review of Systems   Constitutional:  Negative for fever, chills and fatigue.   Skin:  Positive for wears hat. Negative for daily sunscreen use and activity-related sunscreen use.   Hematologic/Lymphatic: Does not bruise/bleed easily.       Objective:   Physical Exam   Constitutional: He appears well-developed and well-nourished. No distress.   Neurological: He is alert and oriented to person, place, and time. He is not disoriented.   Psychiatric: He has a normal mood and affect.   Skin:   Areas Examined (abnormalities noted in diagram):   Scalp / Hair Palpated and Inspected  Head / Face Inspection Performed  Neck Inspection Performed  Chest / Axilla Inspection Performed  Abdomen Inspection Performed  Back Inspection Performed  RUE Inspected  LUE Inspection Performed  Nails and Digits Inspection  Performed                     Diagram Legend     Erythematous scaling macule/papule c/w actinic keratosis       Vascular papule c/w angioma      Pigmented verrucoid papule/plaque c/w seborrheic keratosis      Yellow umbilicated papule c/w sebaceous hyperplasia      Irregularly shaped tan macule c/w lentigo     1-2 mm smooth white papules consistent with Milia      Movable subcutaneous cyst with punctum c/w epidermal inclusion cyst      Subcutaneous movable cyst c/w pilar cyst      Firm pink to brown papule c/w dermatofibroma      Pedunculated fleshy papule(s) c/w skin tag(s)      Evenly pigmented macule c/w junctional nevus     Mildly variegated pigmented, slightly irregular-bordered macule c/w mildly atypical nevus      Flesh colored to evenly pigmented papule c/w intradermal nevus       Pink pearly papule/plaque c/w basal cell carcinoma      Erythematous hyperkeratotic cursted plaque c/w SCC      Surgical scar with no sign of skin cancer recurrence      Open and closed comedones      Inflammatory papules and pustules      Verrucoid papule consistent consistent with wart     Erythematous eczematous patches and plaques     Dystrophic onycholytic nail with subungual debris c/w onychomycosis     Umbilicated papule    Erythematous-base heme-crusted tan verrucoid plaque consistent with inflamed seborrheic keratosis     Erythematous Silvery Scaling Plaque c/w Psoriasis     See annotation           Latest Reference Range & Units 11/14/23 09:29   WBC 3.90 - 12.70 K/uL 8.67   RBC 4.60 - 6.20 M/uL 5.05   Hemoglobin 14.0 - 18.0 g/dL 14.2   Hematocrit 40.0 - 54.0 % 44.6   MCV 82 - 98 fL 88   MCH 27.0 - 31.0 pg 28.1   MCHC 32.0 - 36.0 g/dL 31.8 (L)   RDW 11.5 - 14.5 % 14.3   Platelet Count 150 - 450 K/uL 274   MPV 9.2 - 12.9 fL 9.3   Gran % 38.0 - 73.0 % 53.2   Lymph % 18.0 - 48.0 % 31.8   Mono % 4.0 - 15.0 % 7.2   Eosinophil % 0.0 - 8.0 % 6.9   Basophil % 0.0 - 1.9 % 0.7   Immature Granulocytes 0.0 - 0.5 % 0.2   Gran # (ANC)  1.8 - 7.7 K/uL 4.6   Lymph # 1.0 - 4.8 K/uL 2.8   Mono # 0.3 - 1.0 K/uL 0.6   Eos # 0.0 - 0.5 K/uL 0.6 (H)   Baso # 0.00 - 0.20 K/uL 0.06   Immature Grans (Abs) 0.00 - 0.04 K/uL 0.02   nRBC 0 /100 WBC 0   Differential Method  Automated   Sodium 136 - 145 mmol/L 141   Potassium 3.5 - 5.1 mmol/L 3.9   Chloride 95 - 110 mmol/L 107   CO2 23 - 29 mmol/L 23   Anion Gap 8 - 16 mmol/L 11   BUN 6 - 20 mg/dL 17   Creatinine 0.5 - 1.4 mg/dL 0.9   eGFR >60 mL/min/1.73 m^2 >60.0   Glucose 70 - 110 mg/dL 103   Calcium 8.7 - 10.5 mg/dL 9.8   ALP 55 - 135 U/L 74   PROTEIN TOTAL 6.0 - 8.4 g/dL 7.9   Albumin 3.5 - 5.2 g/dL 4.0   BILIRUBIN TOTAL 0.1 - 1.0 mg/dL 0.3   AST 10 - 40 U/L 20   ALT 10 - 44 U/L 25   Cholesterol Total 120 - 199 mg/dL 132   HDL 40 - 75 mg/dL 29 (L)   HDL/Cholesterol Ratio 20.0 - 50.0 % 22.0   Non-HDL Cholesterol mg/dL 103   Total Cholesterol/HDL Ratio 2.0 - 5.0  4.6   Triglycerides 30 - 150 mg/dL 212 (H)   LDL Cholesterol 63.0 - 159.0 mg/dL 60.6 (L)   Hemoglobin A1C External 4.0 - 5.6 % 5.9 (H)   Estimated Avg Glucose 68 - 131 mg/dL 123   (L): Data is abnormally low  (H): Data is abnormally high  Assessment / Plan:      Pathology Orders:       Normal Orders This Visit    Specimen to Pathology, Dermatology     Comments:    Number of Specimens:->1  ------------------------->-------------------------  Spec 1 Procedure:->Biopsy  Spec 1 Clinical Impression:->psoriasis vs neoplastic  Spec 1 Source:->left forearm    Questions:    Procedure Type: Dermatology and skin neoplasms    Number of Specimens: 1    ------------------------: -------------------------    Spec 1 Procedure: Biopsy    Spec 1 Clinical Impression: psoriasis vs neoplastic    Spec 1 Source: left forearm    Release to patient:           Disease of skin and subcutaneous tissue  -     clobetasol 0.05% (TEMOVATE) 0.05 % Oint; Apply thin film to thick psoriatic plaques on body daily to BID PRN rash  Dispense: 60 g; Refill: 1  -     Specimen to Pathology,  Dermatology  Shave biopsy procedure note:    Shave biopsy performed after verbal consent including risk of infection, scar, recurrence, need for additional treatment of site. Area prepped with alcohol, anesthetized with approximately 1.0cc of 1% lidocaine with epinephrine. Lesional tissue shaved with razor blade. Hemostasis achieved with application of aluminum chloride followed by hyfrecation. No complications. Dressing applied. Wound care explained.    Favor plaque psoriasis  Few cut lesions only  Concern for psoA (+ am stiffness, enthesitis, MCP swelling and pain, onycholysis, neg RF)  Consider systemic meds if pso confirmed  Rheum evaluation would be welcome    Skin lesions  -     Ambulatory referral/consult to Dermatology    Seborrheic keratoses  These are benign inherited growths without a malignant potential. Reassurance given to patient. No treatment is necessary.     Sebopsoriasis  -     ketoconazole (NIZORAL) 2 % shampoo; Wash hair with medicated shampoo at least 2x/week - let sit on scalp at least 5 minutes prior to rinsing  Dispense: 120 mL; Refill: 5  -     sulfacetamide sodium-sulfur 10-5 % (w/w) Clsr; Use to wash face daily  Dispense: 170 g; Refill: 5  -     hydrocortisone 2.5 % lotion; Apply thin film to AA on face and ears daily to BID PRn flare, use short term only (1-2 weeks)  Dispense: 59 mL; Refill: 2    Arthralgia, unspecified joint  -     Ambulatory referral/consult to Rheumatology; Future; Expected date: 12/18/2023               Follow up in about 3 months (around 3/11/2024), or if symptoms worsen or fail to improve.

## 2023-12-17 DIAGNOSIS — L40.8 SEBOPSORIASIS: ICD-10-CM

## 2023-12-18 RX ORDER — HYDROCORTISONE 25 MG/ML
LOTION TOPICAL
Qty: 59 ML | Refills: 2 | Status: SHIPPED | OUTPATIENT
Start: 2023-12-18

## 2023-12-18 NOTE — TELEPHONE ENCOUNTER
Per patient, no [facility] has this med and it is on back order - [facility]

## 2023-12-18 NOTE — PATIENT INSTRUCTIONS
Shave Biopsy Wound Care    Your doctor has performed a shave biopsy today.  A band aid and vaseline ointment has been placed over the site.  This should remain in place for 24 hours.  It is recommended that you keep the area dry for the first 24 hours.  After 24 hours, you may remove the band aid and wash the area with warm soap and water and apply Vaseline jelly.  Many patients prefer to use Neosporin or Bacitracin ointment.  This is acceptable; however, know that you can develop an allergy to this medication even if you have used it safely for years.  It is important to keep the area moist.  Letting it dry out and get air slows healing time, and will worsen the scar.  Band aid is optional after first 24 hours.      If you notice increasing redness, tenderness, pain, or yellow drainage at the biopsy site, please notify your doctor.  These are signs of an infection.    If your biopsy site is bleeding, apply firm pressure for 15 minutes straight.  Repeat for another 15 minutes, if it is still bleeding.   If the surgical site continues to bleed, then please contact your doctor.       [facility] - DERMATOLOGY  [address], [address]  [place] [id number]  Dept: [phone]  Dept Fax: [phone]

## 2023-12-20 LAB
FINAL PATHOLOGIC DIAGNOSIS: NORMAL
Lab: NORMAL

## 2023-12-21 ENCOUNTER — TELEPHONE (OUTPATIENT)
Dept: RHEUMATOLOGY | Facility: CLINIC | Age: 58
End: 2023-12-21
Payer: OTHER GOVERNMENT

## 2023-12-22 ENCOUNTER — TELEPHONE (OUTPATIENT)
Dept: RHEUMATOLOGY | Facility: CLINIC | Age: 58
End: 2023-12-22
Payer: OTHER GOVERNMENT

## 2023-12-22 NOTE — TELEPHONE ENCOUNTER
Made new patient appt for the patient. Per Gordo  ----- Message from Sissy Ferreira sent at 12/21/2023  3:56 PM CST -----  Regarding: Patient Returning Call  Contact: patient at [phone]  Type:  Patient Returning Call    Who Called:  patient at [phone]    Who Left Message for Patient:  Ria Ontiveros LPN  Does the patient know what this is regarding?:  yes, appointment    Additional Information:  Please call and advise. Thank you

## 2024-03-07 ENCOUNTER — PATIENT MESSAGE (OUTPATIENT)
Dept: DERMATOLOGY | Facility: CLINIC | Age: 59
End: 2024-03-07
Payer: OTHER GOVERNMENT

## 2024-03-15 ENCOUNTER — PATIENT MESSAGE (OUTPATIENT)
Dept: DERMATOLOGY | Facility: CLINIC | Age: 59
End: 2024-03-15
Payer: OTHER GOVERNMENT

## 2024-03-25 NOTE — PROGRESS NOTES
"Subjective:      Patient ID: Erickson Decker is a 58 y.o. male.    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 He has developed pain and swelling in his hands, wrists, and ankles over the past several years that worsened over the past 2 years. He reports tingling in his fingertips, dry mouth, and difficulty with dexterity.     The patient denies fevers, oral/nasal ulcers, pleuritic chest pain, shortness of breath, cough, abdominal pain, diarrhea, bloody stool, back pain, weakness, and Raynaud's.     Objective:   BP (!) 145/98   Pulse 71   Ht 5' 10" (1.778 m)   Wt 104 kg (229 lb 4.5 oz)   BMI 32.90 kg/m²   Physical Exam   Constitutional: normal appearance.   HENT:   Head: Normocephalic and atraumatic.   Cardiovascular: Normal rate, regular rhythm and normal heart sounds.   Pulmonary/Chest: Effort normal and breath sounds normal.   Musculoskeletal:      Comments: Non tender swelling of both wrists, 2nd and 3rd right MCPs, 2nd to 4th left MCPs, bilateral knees "   Neurological: He is alert.   Skin: Skin is warm and dry. No rash noted.       No data to display    Labs independently reviewed by me (11/14/23)  CBC WNL   CMP WNL  CRP WNL  ESR 23     Assessment:     1. Psoriasis    2. Arthralgia, unspecified joint    3. Medication management    4. Inflammatory arthritis    5. Former smoker      This is a 58 year old man with HTN, HLD, pre-DM, cataracts s/p removal, former smoker, and biopsy proven psoriasis. He has developed pain and swelling in his hands, wrists, and ankles over the past several years that worsened over the past 2 years. He reports tingling in his fingertips, dry mouth, and difficulty with dexterity. Physical examination shows non tender swelling of both wrists, 2nd and 3rd right MCPs, 2nd to 4th left MCPs, bilateral knees.  I suspect psoriatic arthritis.  He has had a partial response to Mobic.  I think he will benefit from starting Otezla but will complete workup as outlined below 1st.    Plan:     Problem List Items Addressed This Visit    None  Visit Diagnoses       Psoriasis    -  Primary    Relevant Orders    CBC Auto Differential    Comprehensive Metabolic Panel    Sedimentation rate    C-Reactive Protein    Hepatitis B surface antigen    HBcAB    Hepatitis B surface antibody    Hepatitis C antibody    Quantiferon Gold TB    HIV 1/2 Ag/Ab (4th Gen)    X-ray Arthritis Survey    Arthralgia, unspecified joint        Relevant Orders    CBC Auto Differential    Comprehensive Metabolic Panel    Sedimentation rate    C-Reactive Protein    Hepatitis B surface antigen    HBcAB    Hepatitis B surface antibody    Hepatitis C antibody    Quantiferon Gold TB    HIV 1/2 Ag/Ab (4th Gen)    X-ray Arthritis Survey    Medication management        Relevant Orders    CBC Auto Differential    Comprehensive Metabolic Panel    Sedimentation rate    C-Reactive Protein    Hepatitis B surface antigen    HBcAB    Hepatitis B surface antibody    Hepatitis C antibody    Quantiferon  Gold TB    HIV 1/2 Ag/Ab (4th Gen)    X-ray Arthritis Survey    Inflammatory arthritis        Former smoker              Follow up in 1-2 weeks    60 minutes of total time spent on the encounter, which includes face to face time and non-face to face time preparing to see the patient (eg, review of tests), Obtaining and/or reviewing separately obtained history, Documenting clinical information in the electronic or other health record, Independently interpreting results (not separately reported) and communicating results to the patient/family/caregiver, or Care coordination (not separately reported).     This note was prepared with [facility] Direct voice recognition transcription software. Garbled syntax, mangled pronouns, and other bizarre constructions may be attributed to that software system       Bebe Driscoll M.D.  Rheumatology Dept  [place], [place]

## 2024-03-26 ENCOUNTER — OFFICE VISIT (OUTPATIENT)
Dept: RHEUMATOLOGY | Facility: CLINIC | Age: 59
End: 2024-03-26
Payer: OTHER GOVERNMENT

## 2024-03-26 ENCOUNTER — HOSPITAL ENCOUNTER (OUTPATIENT)
Dept: RADIOLOGY | Facility: HOSPITAL | Age: 59
Discharge: HOME | End: 2024-03-26
Attending: STUDENT IN AN ORGANIZED HEALTH CARE EDUCATION/TRAINING PROGRAM
Payer: OTHER GOVERNMENT

## 2024-03-26 ENCOUNTER — TELEPHONE (OUTPATIENT)
Dept: DERMATOLOGY | Facility: CLINIC | Age: 59
End: 2024-03-26
Payer: OTHER GOVERNMENT

## 2024-03-26 VITALS
HEART RATE: 71 BPM | WEIGHT: 229.25 LBS | DIASTOLIC BLOOD PRESSURE: 98 MMHG | BODY MASS INDEX: 32.82 KG/M2 | HEIGHT: 70 IN | SYSTOLIC BLOOD PRESSURE: 145 MMHG

## 2024-03-26 DIAGNOSIS — L40.9 PSORIASIS: ICD-10-CM

## 2024-03-26 DIAGNOSIS — Z87.891 FORMER SMOKER: ICD-10-CM

## 2024-03-26 DIAGNOSIS — Z79.899 MEDICATION MANAGEMENT: ICD-10-CM

## 2024-03-26 DIAGNOSIS — L40.9 PSORIASIS: Primary | ICD-10-CM

## 2024-03-26 DIAGNOSIS — M25.50 ARTHRALGIA, UNSPECIFIED JOINT: ICD-10-CM

## 2024-03-26 DIAGNOSIS — M19.90 INFLAMMATORY ARTHRITIS: ICD-10-CM

## 2024-03-26 PROCEDURE — 77077 JOINT SURVEY SINGLE VIEW: CPT | Mod: TC,PO

## 2024-03-26 PROCEDURE — 99205 OFFICE O/P NEW HI 60 MIN: CPT | Mod: S$PBB,,, | Performed by: STUDENT IN AN ORGANIZED HEALTH CARE EDUCATION/TRAINING PROGRAM

## 2024-03-26 PROCEDURE — 99215 OFFICE O/P EST HI 40 MIN: CPT | Mod: PBBFAC,PN,25 | Performed by: STUDENT IN AN ORGANIZED HEALTH CARE EDUCATION/TRAINING PROGRAM

## 2024-03-26 PROCEDURE — 99999 PR PBB SHADOW E&M-EST. PATIENT-LVL V: CPT | Mod: PBBFAC,,, | Performed by: STUDENT IN AN ORGANIZED HEALTH CARE EDUCATION/TRAINING PROGRAM

## 2024-03-26 PROCEDURE — 77077 JOINT SURVEY SINGLE VIEW: CPT | Mod: 26,,, | Performed by: RADIOLOGY

## 2024-03-26 NOTE — TELEPHONE ENCOUNTER
Returned call to patient, patient scheduled.     ----- Message from Tonya Choudhary sent at 3/26/2024  2:59 PM CDT -----  Contact: self  Type:  Patient Returning Call    Who Called:  pt  Who Left Message for Patient:  charbel  Does the patient know what this is regarding?:  yes  Best Call Back Number:  [phone]   Additional Information:  pt is is the [employer] and just returned home.pt apologizes for not calling earlier.please call

## 2024-04-04 ENCOUNTER — OFFICE VISIT (OUTPATIENT)
Dept: DERMATOLOGY | Facility: CLINIC | Age: 59
End: 2024-04-04
Payer: OTHER GOVERNMENT

## 2024-04-04 VITALS — WEIGHT: 229.25 LBS | HEIGHT: 70 IN | BODY MASS INDEX: 32.82 KG/M2

## 2024-04-04 DIAGNOSIS — L40.0 PLAQUE PSORIASIS: Primary | ICD-10-CM

## 2024-04-04 DIAGNOSIS — M19.90 INFLAMMATORY ARTHRITIS: ICD-10-CM

## 2024-04-04 DIAGNOSIS — L82.1 SEBORRHEIC KERATOSES: ICD-10-CM

## 2024-04-04 PROCEDURE — 99213 OFFICE O/P EST LOW 20 MIN: CPT | Mod: AQ,S$GLB,, | Performed by: DERMATOLOGY

## 2024-04-04 RX ORDER — ROFLUMILAST 3 MG/G
1 CREAM TOPICAL DAILY
Qty: 60 G | Refills: 5 | Status: ACTIVE | OUTPATIENT
Start: 2024-04-04 | End: 2024-04-12 | Stop reason: SDUPTHER

## 2024-04-04 NOTE — PROGRESS NOTES
Subjective:      Patient ID:  Erickson Decker is a 58 y.o. male who presents for   Chief Complaint   Patient presents with    Skin Check     UBSE      LOV 12/11/23 - SK, skin lesions, sebopsoriasis, arthralgia   Interim rheum eval   [employer]- [employer], lengthy out of country trips    Biopsy of rash- Favor plaque psoriasis  Few cut lesions only  Concern for psoA (+ am stiffness, enthesitis, MCP swelling and pain, onycholysis, neg RF)  Consider systemic meds if pso confirmed  Rheum evaluation would be welcome  Dr Milan:  1. Psoriasis   2. Arthralgia, unspecified joint   3. Medication management   4. Inflammatory arthritis   5. Former smoker      This is a 58 year old man with HTN, HLD, pre-DM, cataracts s/p removal, former smoker, and biopsy proven psoriasis. He has developed pain and swelling in his hands, wrists, and ankles over the past several years that worsened over the past 2 years. He reports tingling in his fingertips, dry mouth, and difficulty with dexterity. Physical examination shows non tender swelling of both wrists, 2nd and 3rd right MCPs, 2nd to 4th left MCPs, bilateral knees.  I suspect psoriatic arthritis.  He has had a partial response to Mobic.  I think he will benefit from starting Otezla but will complete workup as outlined below 1st.    RELIAPATH DIAGNOSIS:    SKIN, LEFT FOREARM, SHAVE BIOPSY:  - Psoriasiform dermatitis, consistent with psoriasis.  - No fungal organisms identified with *PAS- F stain.      Patient here today for UBSC  No new concerns currently    Derm Hx:  Denies Phx of NMSC  Denies Fhx of MM    Current Outpatient Medications:   ·  clobetasol 0.05% (TEMOVATE) 0.05 % Oint, Apply thin film to thick psoriatic plaques on body daily to BID PRN rash, Disp: 60 g, Rfl: 1  ·  diclofenac sodium (VOLTAREN) 1 % Gel, APPLY 2 GRAMS TOPICALLY TO THE AFFECTED AREA FOUR TIMES DAILY, Disp: 100 g, Rfl: 2  ·  fenofibrate (TRICOR) 145 MG tablet, Take 1 tablet (145 mg total) by mouth once  daily., Disp: 90 tablet, Rfl: 3  ·  hydroCHLOROthiazide (HYDRODIURIL) 25 MG tablet, TAKE 1 TABLET(25 MG) BY MOUTH EVERY DAY, Disp: 90 tablet, Rfl: 3  ·  hydrocortisone 2.5 % lotion, Apply thin film to AA on face and ears daily to BID PRn flare, use short term only (1-2 weeks), Disp: 59 mL, Rfl: 2  ·  ketoconazole (NIZORAL) 2 % shampoo, Wash hair with medicated shampoo at least 2x/week - let sit on scalp at least 5 minutes prior to rinsing, Disp: 120 mL, Rfl: 5  ·  meloxicam (MOBIC) 15 MG tablet, TAKE 1 TABLET(15 MG) BY MOUTH EVERY DAY, Disp: 90 tablet, Rfl: 3  ·  omega-3 fatty acids/fish oil (FISH OIL-OMEGA-3 FATTY ACIDS) 300-1,000 mg capsule, Take by mouth once daily., Disp: , Rfl:   ·  rosuvastatin (CRESTOR) 20 MG tablet, TAKE 1 TABLET(20 MG) BY MOUTH EVERY DAY, Disp: 90 tablet, Rfl: 3  ·  sulfacetamide sodium-sulfur 10-5 % (w/w) Clsr, Use to wash face daily, Disp: 170 g, Rfl: 5        Review of Systems   Constitutional:  Negative for fever, chills and fatigue.   Skin:  Positive for wears hat. Negative for daily sunscreen use and activity-related sunscreen use.   Hematologic/Lymphatic: Does not bruise/bleed easily.       Objective:   Physical Exam   Constitutional: He appears well-developed and well-nourished. No distress.   Neurological: He is alert and oriented to person, place, and time. He is not disoriented.   Psychiatric: He has a normal mood and affect.   Skin:   Areas Examined (abnormalities noted in diagram):   Scalp / Hair Palpated and Inspected  Head / Face Inspection Performed  Neck Inspection Performed  Chest / Axilla Inspection Performed  Abdomen Inspection Performed  Back Inspection Performed  RUE Inspected  LUE Inspection Performed  Nails and Digits Inspection Performed                     Diagram Legend     Erythematous scaling macule/papule c/w actinic keratosis       Vascular papule c/w angioma      Pigmented verrucoid papule/plaque c/w seborrheic keratosis      Yellow umbilicated papule c/w  sebaceous hyperplasia      Irregularly shaped tan macule c/w lentigo     1-2 mm smooth white papules consistent with Milia      Movable subcutaneous cyst with punctum c/w epidermal inclusion cyst      Subcutaneous movable cyst c/w pilar cyst      Firm pink to brown papule c/w dermatofibroma      Pedunculated fleshy papule(s) c/w skin tag(s)      Evenly pigmented macule c/w junctional nevus     Mildly variegated pigmented, slightly irregular-bordered macule c/w mildly atypical nevus      Flesh colored to evenly pigmented papule c/w intradermal nevus       Pink pearly papule/plaque c/w basal cell carcinoma      Erythematous hyperkeratotic cursted plaque c/w SCC      Surgical scar with no sign of skin cancer recurrence      Open and closed comedones      Inflammatory papules and pustules      Verrucoid papule consistent consistent with wart     Erythematous eczematous patches and plaques     Dystrophic onycholytic nail with subungual debris c/w onychomycosis     Umbilicated papule    Erythematous-base heme-crusted tan verrucoid plaque consistent with inflamed seborrheic keratosis     Erythematous Silvery Scaling Plaque c/w Psoriasis     See annotation      Assessment / Plan:        Plaque psoriasis  -     roflumilast (ZORYVE) 0.3 % Crea; Apply 1 application  topically once daily. Use on ears, face and genitalia  Dispense: 60 g; Refill: 5    Inflammatory arthritis         Seen by Dr Milan 03/26/24, agrees with inflammatory arthritis  Pre tx BARAKAT in process  Plans otezla  Needs non steroidal for facial and genital psoriasis, rx zoryve    No follow-ups on file.

## 2024-04-08 NOTE — PROGRESS NOTES
Subjective:      Patient ID: Erickson Decker is a 58 y.o. male.    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  - Positive serologies: YESENIA 1:80 homogenous  - Negative serologies: dsDNA, SSA, SSB, Sm, RNP, RF, CCP  - Pathology:              - Skin, left forearm (12/2023) Psoriasiform dermatitis, consistent with psoriasis  - Infectious screening labs: Negative HIV, hepatitis-B, hepatitis-C, and QuantiFERON (03/26/2024)  - Imaging:              - Xray bilateral hands (11/2021) mild degenerative changes  - Previous Treatments:  - Current Treatments:               - Mobic 15mg daily: helpful with ankles but not hands    - Otezla requesting PA    Interval History:   He has persistent pain and swelling in his hands and knees.     Initial History:    He has developed pain and swelling in his hands, wrists, and ankles over the past several years that worsened over the past 2 years. He reports tingling in his fingertips, dry mouth, and difficulty with dexterity. Physical examination shows non tender swelling of both wrists, 2nd and 3rd right MCPs, 2nd to 4th left MCPs, bilateral knees.  I suspect psoriatic arthritis.  He has had a partial response to Mobic.  I think he will benefit from starting Otezla but will complete workup as outlined below 1st.     Objective:   BP (!) 156/94 (BP Location: Right arm, Patient Position: Sitting)   Pulse 71   Physical Exam   Constitutional: normal appearance.   HENT:   Head: Normocephalic and atraumatic.   Cardiovascular: Normal rate, regular rhythm and normal heart sounds.    Pulmonary/Chest: Effort normal and breath sounds normal.   Musculoskeletal:      Comments: Non tender swelling of both wrists, 2nd and 3rd right MCPs, 2nd to 4th left MCPs, bilateral knees   Neurological: He is alert.   Skin: Skin is warm and dry. No rash noted.       No data to display    Labs independently reviewed by me (03/26/2024)  CBC WNL  CMP WNL  ESR and CRP WNL     Assessment:     1. Psoriasis    2. Psoriatic arthritis    3. Medication management    4. Former smoker      This is a 58 year old man with HTN, HLD, pre-DM, cataracts s/p removal, former smoker, and biopsy proven psoriasis. He has persistent pain and swelling in his hands and knees. He has had only partial response to NSAIDs.  He is a good candidate for Otezla because he works with the [employer] and is out of the country 2/3 of the year.  Otezla would be the least immunosuppressive option that would require the least monitoring.    Plan:     Problem List Items Addressed This Visit    None  Visit Diagnoses       Psoriasis    -  Primary    Relevant Medications    apremilast (OTEZLA STARTER) 10 mg (4)-20 mg (4)-30 mg (47) DsPk    apremilast (OTEZLA) 30 mg Tab    Other Relevant Orders    C-Reactive Protein    CBC Auto Differential    Creatinine, Serum    ALT (SGPT)    AST (SGOT)    Sedimentation rate    Psoriatic arthritis        Relevant Medications    apremilast (OTEZLA STARTER) 10 mg (4)-20 mg (4)-30 mg (47) DsPk    apremilast (OTEZLA) 30 mg Tab    Other Relevant Orders    C-Reactive Protein    CBC Auto Differential    Creatinine, Serum    ALT (SGPT)    AST (SGOT)    Sedimentation rate    Medication management        Former smoker              - request prior authorization for Otezla.  I discussed potential side effects including gastrointestinal side effects and suicidal ideations.  Patient information for Otezla was provided  - Labs before follow up in 3 months     Follow up in 3 months    30 minutes of total time spent on the encounter, which  includes face to face time and non-face to face time preparing to see the patient (eg, review of tests), Obtaining and/or reviewing separately obtained history, Documenting clinical information in the electronic or other health record, Independently interpreting results (not separately reported) and communicating results to the patient/family/caregiver, or Care coordination (not separately reported).     This note was prepared with [employer] Direct voice recognition transcription software. Garbled syntax, mangled pronouns, and other bizarre constructions may be attributed to that software system       Bebe Driscoll M.D.  Rheumatology Dept  [place], [place]

## 2024-04-10 ENCOUNTER — OFFICE VISIT (OUTPATIENT)
Dept: RHEUMATOLOGY | Facility: CLINIC | Age: 59
End: 2024-04-10
Payer: OTHER GOVERNMENT

## 2024-04-10 ENCOUNTER — PATIENT MESSAGE (OUTPATIENT)
Dept: DERMATOLOGY | Facility: CLINIC | Age: 59
End: 2024-04-10
Payer: OTHER GOVERNMENT

## 2024-04-10 VITALS — SYSTOLIC BLOOD PRESSURE: 156 MMHG | DIASTOLIC BLOOD PRESSURE: 94 MMHG | HEART RATE: 71 BPM

## 2024-04-10 DIAGNOSIS — Z87.891 FORMER SMOKER: ICD-10-CM

## 2024-04-10 DIAGNOSIS — L40.50 PSORIATIC ARTHRITIS: ICD-10-CM

## 2024-04-10 DIAGNOSIS — L40.9 PSORIASIS: Primary | ICD-10-CM

## 2024-04-10 DIAGNOSIS — L40.0 PLAQUE PSORIASIS: ICD-10-CM

## 2024-04-10 DIAGNOSIS — Z79.899 MEDICATION MANAGEMENT: ICD-10-CM

## 2024-04-10 PROCEDURE — 99214 OFFICE O/P EST MOD 30 MIN: CPT | Mod: S$PBB,,, | Performed by: STUDENT IN AN ORGANIZED HEALTH CARE EDUCATION/TRAINING PROGRAM

## 2024-04-10 PROCEDURE — 99213 OFFICE O/P EST LOW 20 MIN: CPT | Mod: PBBFAC,PN | Performed by: STUDENT IN AN ORGANIZED HEALTH CARE EDUCATION/TRAINING PROGRAM

## 2024-04-10 PROCEDURE — 99999 PR PBB SHADOW E&M-EST. PATIENT-LVL III: CPT | Mod: PBBFAC,,, | Performed by: STUDENT IN AN ORGANIZED HEALTH CARE EDUCATION/TRAINING PROGRAM

## 2024-04-12 RX ORDER — ROFLUMILAST 3 MG/G
1 CREAM TOPICAL DAILY
Qty: 60 G | Refills: 5 | Status: ACTIVE | OUTPATIENT
Start: 2024-04-12 | End: 2024-04-18 | Stop reason: SDUPTHER

## 2024-04-16 PROBLEM — L40.9 PSORIASIS: Status: ACTIVE | Noted: 2024-04-16

## 2024-04-16 PROBLEM — L40.50 PSORIATIC ARTHRITIS: Status: ACTIVE | Noted: 2024-04-16

## 2024-04-18 DIAGNOSIS — L40.0 PLAQUE PSORIASIS: ICD-10-CM

## 2024-04-18 RX ORDER — ROFLUMILAST 3 MG/G
1 CREAM TOPICAL DAILY
Qty: 60 G | Refills: 5 | Status: CANCELLED | OUTPATIENT
Start: 2024-04-18

## 2024-04-23 ENCOUNTER — PATIENT MESSAGE (OUTPATIENT)
Dept: FAMILY MEDICINE | Facility: CLINIC | Age: 59
End: 2024-04-23
Payer: OTHER GOVERNMENT

## 2024-04-29 RX ORDER — FENOFIBRATE 145 MG/1
145 TABLET, FILM COATED ORAL
Qty: 90 TABLET | Refills: 1 | Status: SHIPPED | OUTPATIENT
Start: 2024-04-29 | End: 2024-05-14

## 2024-04-29 NOTE — TELEPHONE ENCOUNTER
Refill Decision Note   Erickson Decker  is requesting a refill authorization.  Brief Assessment and Rationale for Refill:  Approve     Medication Therapy Plan:         Comments:     Note composed:2:57 PM 04/29/2024

## 2024-04-29 NOTE — TELEPHONE ENCOUNTER
No care due was identified.  [facility] Embedded Care Due Messages. Reference number: [id number].   4/29/2024 2:49:16 PM CDT

## 2024-05-14 ENCOUNTER — LAB VISIT (OUTPATIENT)
Dept: LAB | Facility: HOSPITAL | Age: 59
End: 2024-05-14
Attending: FAMILY MEDICINE
Payer: OTHER GOVERNMENT

## 2024-05-14 ENCOUNTER — OFFICE VISIT (OUTPATIENT)
Dept: FAMILY MEDICINE | Facility: CLINIC | Age: 59
End: 2024-05-14
Payer: OTHER GOVERNMENT

## 2024-05-14 VITALS
BODY MASS INDEX: 33.79 KG/M2 | RESPIRATION RATE: 12 BRPM | WEIGHT: 236 LBS | OXYGEN SATURATION: 98 % | HEIGHT: 70 IN | HEART RATE: 72 BPM | DIASTOLIC BLOOD PRESSURE: 82 MMHG | SYSTOLIC BLOOD PRESSURE: 138 MMHG

## 2024-05-14 DIAGNOSIS — E78.1 PURE HYPERTRIGLYCERIDEMIA: ICD-10-CM

## 2024-05-14 DIAGNOSIS — L40.50 PSORIATIC ARTHRITIS: ICD-10-CM

## 2024-05-14 DIAGNOSIS — R73.03 PREDIABETES: ICD-10-CM

## 2024-05-14 DIAGNOSIS — I10 PRIMARY HYPERTENSION: Primary | ICD-10-CM

## 2024-05-14 DIAGNOSIS — Z12.5 SCREENING FOR PROSTATE CANCER: ICD-10-CM

## 2024-05-14 DIAGNOSIS — Z00.00 ROUTINE MEDICAL EXAM: ICD-10-CM

## 2024-05-14 DIAGNOSIS — Z12.11 COLON CANCER SCREENING: ICD-10-CM

## 2024-05-14 DIAGNOSIS — I10 ESSENTIAL HYPERTENSION: ICD-10-CM

## 2024-05-14 DIAGNOSIS — R53.83 FATIGUE, UNSPECIFIED TYPE: ICD-10-CM

## 2024-05-14 DIAGNOSIS — L60.0: ICD-10-CM

## 2024-05-14 DIAGNOSIS — R79.89 LOW SERUM VITAMIN D: Primary | ICD-10-CM

## 2024-05-14 DIAGNOSIS — I10 PRIMARY HYPERTENSION: ICD-10-CM

## 2024-05-14 LAB
25(OH)D3+25(OH)D2 SERPL-MCNC: 36 NG/ML (ref 30–96)
ALBUMIN SERPL BCP-MCNC: 4 G/DL (ref 3.5–5.2)
ALP SERPL-CCNC: 72 U/L (ref 55–135)
ALT SERPL W/O P-5'-P-CCNC: 38 U/L (ref 10–44)
ANION GAP SERPL CALC-SCNC: 11 MMOL/L (ref 8–16)
AST SERPL-CCNC: 29 U/L (ref 10–40)
BASOPHILS # BLD AUTO: 0.07 K/UL (ref 0–0.2)
BASOPHILS NFR BLD: 0.8 % (ref 0–1.9)
BILIRUB SERPL-MCNC: 0.6 MG/DL (ref 0.1–1)
BUN SERPL-MCNC: 20 MG/DL (ref 6–20)
CALCIUM SERPL-MCNC: 9.8 MG/DL (ref 8.7–10.5)
CHLORIDE SERPL-SCNC: 103 MMOL/L (ref 95–110)
CHOLEST SERPL-MCNC: 144 MG/DL (ref 120–199)
CHOLEST/HDLC SERPL: 4.8 {RATIO} (ref 2–5)
CO2 SERPL-SCNC: 24 MMOL/L (ref 23–29)
COMPLEXED PSA SERPL-MCNC: 0.26 NG/ML (ref 0–4)
CREAT SERPL-MCNC: 0.9 MG/DL (ref 0.5–1.4)
DIFFERENTIAL METHOD BLD: ABNORMAL
EOSINOPHIL # BLD AUTO: 0.7 K/UL (ref 0–0.5)
EOSINOPHIL NFR BLD: 7.8 % (ref 0–8)
ERYTHROCYTE [DISTWIDTH] IN BLOOD BY AUTOMATED COUNT: 14.3 % (ref 11.5–14.5)
EST. GFR  (NO RACE VARIABLE): >60 ML/MIN/1.73 M^2
ESTIMATED AVG GLUCOSE: 128 MG/DL (ref 68–131)
GLUCOSE SERPL-MCNC: 97 MG/DL (ref 70–110)
HBA1C MFR BLD: 6.1 % (ref 4–5.6)
HCT VFR BLD AUTO: 42.7 % (ref 40–54)
HDLC SERPL-MCNC: 30 MG/DL (ref 40–75)
HDLC SERPL: 20.8 % (ref 20–50)
HGB BLD-MCNC: 14.4 G/DL (ref 14–18)
IMM GRANULOCYTES # BLD AUTO: 0.03 K/UL (ref 0–0.04)
IMM GRANULOCYTES NFR BLD AUTO: 0.4 % (ref 0–0.5)
LDLC SERPL CALC-MCNC: 54.2 MG/DL (ref 63–159)
LYMPHOCYTES # BLD AUTO: 2.5 K/UL (ref 1–4.8)
LYMPHOCYTES NFR BLD: 30.2 % (ref 18–48)
MCH RBC QN AUTO: 28.7 PG (ref 27–31)
MCHC RBC AUTO-ENTMCNC: 33.7 G/DL (ref 32–36)
MCV RBC AUTO: 85 FL (ref 82–98)
MONOCYTES # BLD AUTO: 0.6 K/UL (ref 0.3–1)
MONOCYTES NFR BLD: 6.8 % (ref 4–15)
NEUTROPHILS # BLD AUTO: 4.6 K/UL (ref 1.8–7.7)
NEUTROPHILS NFR BLD: 54 % (ref 38–73)
NONHDLC SERPL-MCNC: 114 MG/DL
NRBC BLD-RTO: 0 /100 WBC
PLATELET # BLD AUTO: 254 K/UL (ref 150–450)
PMV BLD AUTO: 9.2 FL (ref 9.2–12.9)
POTASSIUM SERPL-SCNC: 3.7 MMOL/L (ref 3.5–5.1)
PROT SERPL-MCNC: 7.8 G/DL (ref 6–8.4)
RBC # BLD AUTO: 5.01 M/UL (ref 4.6–6.2)
SODIUM SERPL-SCNC: 138 MMOL/L (ref 136–145)
TRIGL SERPL-MCNC: 299 MG/DL (ref 30–150)
TSH SERPL DL<=0.005 MIU/L-ACNC: 1.25 UIU/ML (ref 0.4–4)
VIT B12 SERPL-MCNC: 627 PG/ML (ref 210–950)
WBC # BLD AUTO: 8.42 K/UL (ref 3.9–12.7)

## 2024-05-14 PROCEDURE — 80061 LIPID PANEL: CPT | Performed by: FAMILY MEDICINE

## 2024-05-14 PROCEDURE — 82306 VITAMIN D 25 HYDROXY: CPT | Performed by: FAMILY MEDICINE

## 2024-05-14 PROCEDURE — 83036 HEMOGLOBIN GLYCOSYLATED A1C: CPT | Performed by: FAMILY MEDICINE

## 2024-05-14 PROCEDURE — 82607 VITAMIN B-12: CPT | Performed by: FAMILY MEDICINE

## 2024-05-14 PROCEDURE — 99214 OFFICE O/P EST MOD 30 MIN: CPT | Mod: PBBFAC | Performed by: FAMILY MEDICINE

## 2024-05-14 PROCEDURE — 80053 COMPREHEN METABOLIC PANEL: CPT | Performed by: FAMILY MEDICINE

## 2024-05-14 PROCEDURE — 36415 COLL VENOUS BLD VENIPUNCTURE: CPT | Performed by: FAMILY MEDICINE

## 2024-05-14 PROCEDURE — 84153 ASSAY OF PSA TOTAL: CPT | Performed by: FAMILY MEDICINE

## 2024-05-14 PROCEDURE — 84443 ASSAY THYROID STIM HORMONE: CPT | Performed by: FAMILY MEDICINE

## 2024-05-14 PROCEDURE — 99214 OFFICE O/P EST MOD 30 MIN: CPT | Mod: S$PBB,,, | Performed by: FAMILY MEDICINE

## 2024-05-14 PROCEDURE — 99999 PR PBB SHADOW E&M-EST. PATIENT-LVL IV: CPT | Mod: PBBFAC,,, | Performed by: FAMILY MEDICINE

## 2024-05-14 PROCEDURE — 85025 COMPLETE CBC W/AUTO DIFF WBC: CPT | Performed by: FAMILY MEDICINE

## 2024-05-14 RX ORDER — FENOFIBRATE 54 MG/1
54 TABLET ORAL DAILY
Qty: 90 TABLET | Refills: 3 | Status: SHIPPED | OUTPATIENT
Start: 2024-05-14 | End: 2025-05-14

## 2024-05-14 RX ORDER — SULFAMETHOXAZOLE AND TRIMETHOPRIM 800; 160 MG/1; MG/1
1 TABLET ORAL 2 TIMES DAILY
Qty: 14 TABLET | Refills: 0 | Status: SHIPPED | OUTPATIENT
Start: 2024-05-14

## 2024-05-14 RX ORDER — ERGOCALCIFEROL 1.25 MG/1
50000 CAPSULE ORAL
Qty: 13 CAPSULE | Refills: 3 | Status: SHIPPED | OUTPATIENT
Start: 2024-05-14

## 2024-05-14 RX ORDER — HYDROCORTISONE 25 MG/ML
LOTION TOPICAL
COMMUNITY
Start: 2024-04-07

## 2024-05-14 RX ORDER — TELMISARTAN AND HYDROCHLORTHIAZIDE 40; 12.5 MG/1; MG/1
1 TABLET ORAL DAILY
Qty: 90 TABLET | Refills: 3 | Status: SHIPPED | OUTPATIENT
Start: 2024-05-14 | End: 2025-05-14

## 2024-05-14 NOTE — PROGRESS NOTES
Subjective:       Patient ID: Erickson Decker is a 58 y.o. male.    Chief Complaint: Follow-up (6 month) and Hypertension      Past Medical History:   Diagnosis Date    Essential hypertension 2020    Mixed hyperlipidemia 2020    Multiple joint pain 2020    Prediabetes 2020       Past Surgical History:   Procedure Laterality Date    ADENOIDECTOMY      APPENDECTOMY  1992    CIRCUMCISION      MANDIBLE SURGERY          Social History     Socioeconomic History    Marital status:    Tobacco Use    Smoking status: Former     Current packs/day: 0.00     Types: Cigarettes     Quit date:      Years since quittin.3    Smokeless tobacco: Never   Substance and Sexual Activity    Alcohol use: Not Currently    Drug use: Never    Sexual activity: Yes     Partners: Female     Birth control/protection: None     Social Determinants of Health     Financial Resource Strain: Low Risk  (2024)    Overall Financial Resource Strain (CARDIA)     Difficulty of Paying Living Expenses: Not very hard   Food Insecurity: No Food Insecurity (2024)    Hunger Vital Sign     Worried About Running Out of Food in the Last Year: Never true     Ran Out of Food in the Last Year: Never true   Transportation Needs: No Transportation Needs (2024)    PRAPARE - Transportation     Lack of Transportation (Medical): No     Lack of Transportation (Non-Medical): No   Physical Activity: Insufficiently Active (2024)    Exercise Vital Sign     Days of Exercise per Week: 2 days     Minutes of Exercise per Session: 20 min   Stress: Stress Concern Present (2024)    [place] [employer] - Occupational Stress Questionnaire     Feeling of Stress : To some extent   Housing Stability: Low Risk  (2024)    Housing Stability Vital Sign     Unable to Pay for Housing in the Last Year: No     Number of Places Lived in the Last Year: 1     Unstable Housing in the Last Year: No       Family History    Problem Relation Name Age of Onset    Arrhythmia Mother      Heart attack Mother      Ulcers Father      Prostate cancer Neg Hx      Colon cancer Neg Hx      Diabetes Neg Hx         Review of patient's allergies indicates:   Allergen Reactions    Bupropion Hives          Current Outpatient Medications:     apremilast (OTEZLA) 30 mg Tab, Take 1 tablet (30 mg total) by mouth 2 (two) times daily., Disp: 190 tablet, Rfl: 1    diclofenac sodium (VOLTAREN) 1 % Gel, APPLY 2 GRAMS TOPICALLY TO THE AFFECTED AREA FOUR TIMES DAILY, Disp: 100 g, Rfl: 2    fenofibrate (TRICOR) 145 MG tablet, TAKE 1 TABLET DAILY, Disp: 90 tablet, Rfl: 1    hydroCHLOROthiazide (HYDRODIURIL) 25 MG tablet, TAKE 1 TABLET(25 MG) BY MOUTH EVERY DAY, Disp: 90 tablet, Rfl: 3    hydrocortisone 2.5 % lotion, Apply topically., Disp: , Rfl:     meloxicam (MOBIC) 15 MG tablet, TAKE 1 TABLET(15 MG) BY MOUTH EVERY DAY, Disp: 90 tablet, Rfl: 3    omega-3 fatty acids/fish oil (FISH OIL-OMEGA-3 FATTY ACIDS) 300-1,000 mg capsule, Take by mouth once daily., Disp: , Rfl:     roflumilast (ZORYVE) 0.3 % Crea, Apply 1 application  topically once daily. Use on ears, face and genitalia, Disp: 60 g, Rfl: 5    rosuvastatin (CRESTOR) 20 MG tablet, TAKE 1 TABLET(20 MG) BY MOUTH EVERY DAY, Disp: 90 tablet, Rfl: 3    sulfacetamide sodium-sulfur 10-5 % (w/w) Clsr, Use to wash face daily, Disp: 170 g, Rfl: 5    sulfamethoxazole-trimethoprim 800-160mg (BACTRIM DS) 800-160 mg Tab, Take 1 tablet by mouth 2 (two) times daily., Disp: 14 tablet, Rfl: 0    Mr Decker is a 58 male here for his routine medical  exam. He has had elevated BP for the past month .    Follow-up  Associated symptoms include arthralgias, joint swelling and myalgias.   Hypertension      Review of Systems   HENT:  Positive for postnasal drip.         Chronic post nasal drainage since he had covid 18 months ago   Respiratory: Negative.     Cardiovascular: Negative.    Gastrointestinal:         Occasional GERD,  managed by [facility]   Endocrine: Negative.    Musculoskeletal:  Positive for arthralgias, joint swelling and myalgias.   Skin:         Chronic ingrowing toenails and fingernails that sometimes    Allergic/Immunologic: Positive for environmental allergies.   Psychiatric/Behavioral:  Negative for sleep disturbance.        Objective:      Physical Exam  Vitals and nursing note reviewed.   Constitutional:       Appearance: Normal appearance. He is normal weight.   HENT:      Head: Normocephalic.      Nose: Nose normal.   Eyes:      Extraocular Movements: Extraocular movements intact.      Conjunctiva/sclera: Conjunctivae normal.      Pupils: Pupils are equal, round, and reactive to light.   Cardiovascular:      Rate and Rhythm: Normal rate and regular rhythm.      Heart sounds: Normal heart sounds. No murmur heard.  Pulmonary:      Effort: Pulmonary effort is normal. No respiratory distress.      Breath sounds: Normal breath sounds.   Musculoskeletal:         General: Normal range of motion.      Cervical back: Normal range of motion and neck supple.   Skin:     General: Skin is warm and dry.   Neurological:      General: No focal deficit present.      Mental Status: He is alert and oriented to person, place, and time.   Psychiatric:         Mood and Affect: Mood normal.         Behavior: Behavior normal.         Assessment:       1. Primary hypertension    2. Psoriatic arthritis    3. Prediabetes    4. Essential hypertension    5. Pure hypertriglyceridemia    6. Fatigue, unspecified type    7. Screening for prostate cancer    8. Routine medical exam    9. Ingrown nail of index finger    10. Colon cancer screening        Plan:         Primary hypertension  -     Microalbumin/Creatinine Ratio, Urine; Future; Expected date: 05/14/2024  -     CBC Auto Differential; Future; Expected date: 05/14/2024  -     Comprehensive Metabolic Panel; Future; Expected date: 05/14/2024    Psoriatic arthritis    Prediabetes  -     Hemoglobin  A1C; Future; Expected date: 05/14/2024  -     TSH; Future; Expected date: 05/14/2024    Essential hypertension    Pure hypertriglyceridemia  -     Lipid Panel; Future; Expected date: 05/14/2024    Fatigue, unspecified type  -     Vitamin B12; Future; Expected date: 05/14/2024  -     TSH; Future; Expected date: 05/14/2024    Screening for prostate cancer  -     PSA, Screening; Future; Expected date: 05/14/2024    Routine medical exam  -     Microalbumin/Creatinine Ratio, Urine; Future; Expected date: 05/14/2024  -     Vitamin D; Future; Expected date: 05/14/2024  -     Vitamin B12; Future; Expected date: 05/14/2024  -     Lipid Panel; Future; Expected date: 05/14/2024  -     CBC Auto Differential; Future; Expected date: 05/14/2024  -     Comprehensive Metabolic Panel; Future; Expected date: 05/14/2024  -     Hemoglobin A1C; Future; Expected date: 05/14/2024  -     TSH; Future; Expected date: 05/14/2024  -     PSA, Screening; Future; Expected date: 05/14/2024    Ingrown nail of index finger  -     sulfamethoxazole-trimethoprim 800-160mg (BACTRIM DS) 800-160 mg Tab; Take 1 tablet by mouth 2 (two) times daily.  Dispense: 14 tablet; Refill: 0    Colon cancer screening  -     Cologuard Screening (Multitarget Stool DNA); Future; Expected date: 05/14/2024        Risks, benefits, and side effects were discussed with the patient. All questions were answered to the fullest satisfaction of the patient, and pt verbalized understanding and agreement to treatment plan. Pt was to call with any new or worsening symptoms, or present to the ER.        Darline Grider MD

## 2024-05-15 ENCOUNTER — TELEPHONE (OUTPATIENT)
Dept: FAMILY MEDICINE | Facility: CLINIC | Age: 59
End: 2024-05-15
Payer: OTHER GOVERNMENT

## 2024-05-15 NOTE — TELEPHONE ENCOUNTER
Attempted to contact patient to give lab results. No answer, left message. Patient also aware of results via [employer]

## 2024-05-15 NOTE — TELEPHONE ENCOUNTER
----- Message from Darline Grider MD sent at 5/14/2024  4:33 PM CDT -----  PSA, TSH, CMP, CBC within acceptable range.    Lipid panel shows markedly elevated triglycerides at 299 a low HDL at 30 and low LDL 54.2.  I know we just lowered the dose on your Tricor due to side effects so we will repeat your lipid panel in 3 months to see how much changes.  Does not seem like a Tricor is helping you, I would suggest that we refer to Cardiology.  If you are okay with that is suggestion, just let me know who you would like to see or where and I will do that ASAP

## 2024-06-14 ENCOUNTER — OCCUPATIONAL HEALTH (OUTPATIENT)
Dept: URGENT CARE | Facility: CLINIC | Age: 59
End: 2024-06-14

## 2024-06-14 DIAGNOSIS — Z00.00 ROUTINE GENERAL MEDICAL EXAMINATION AT A HEALTH CARE FACILITY: Primary | ICD-10-CM

## 2024-06-14 PROCEDURE — 80305 DRUG TEST PRSMV DIR OPT OBS: CPT | Mod: S$GLB,,, | Performed by: EMERGENCY MEDICINE

## 2024-06-19 ENCOUNTER — TELEPHONE (OUTPATIENT)
Dept: RHEUMATOLOGY | Facility: CLINIC | Age: 59
End: 2024-06-19
Payer: OTHER GOVERNMENT

## 2024-06-19 LAB — NONINV COLON CA DNA+OCC BLD SCRN STL QL: NEGATIVE

## 2024-06-21 ENCOUNTER — LAB VISIT (OUTPATIENT)
Dept: LAB | Facility: HOSPITAL | Age: 59
End: 2024-06-21
Attending: STUDENT IN AN ORGANIZED HEALTH CARE EDUCATION/TRAINING PROGRAM
Payer: OTHER GOVERNMENT

## 2024-06-21 DIAGNOSIS — L40.9 PSORIASIS: ICD-10-CM

## 2024-06-21 DIAGNOSIS — L40.50 PSORIATIC ARTHRITIS: ICD-10-CM

## 2024-06-21 LAB
ALT SERPL W/O P-5'-P-CCNC: 48 U/L (ref 10–44)
AST SERPL-CCNC: 33 U/L (ref 10–40)
BASOPHILS # BLD AUTO: 0.04 K/UL (ref 0–0.2)
BASOPHILS NFR BLD: 0.6 % (ref 0–1.9)
CREAT SERPL-MCNC: 1 MG/DL (ref 0.5–1.4)
CRP SERPL-MCNC: 1.1 MG/L (ref 0–8.2)
DIFFERENTIAL METHOD BLD: NORMAL
EOSINOPHIL # BLD AUTO: 0.2 K/UL (ref 0–0.5)
EOSINOPHIL NFR BLD: 3.3 % (ref 0–8)
ERYTHROCYTE [DISTWIDTH] IN BLOOD BY AUTOMATED COUNT: 14.4 % (ref 11.5–14.5)
ERYTHROCYTE [SEDIMENTATION RATE] IN BLOOD BY WESTERGREN METHOD: 5 MM/HR (ref 0–10)
EST. GFR  (NO RACE VARIABLE): >60 ML/MIN/1.73 M^2
HCT VFR BLD AUTO: 44.1 % (ref 40–54)
HGB BLD-MCNC: 14.3 G/DL (ref 14–18)
IMM GRANULOCYTES # BLD AUTO: 0.02 K/UL (ref 0–0.04)
IMM GRANULOCYTES NFR BLD AUTO: 0.3 % (ref 0–0.5)
LYMPHOCYTES # BLD AUTO: 2.2 K/UL (ref 1–4.8)
LYMPHOCYTES NFR BLD: 30.7 % (ref 18–48)
MCH RBC QN AUTO: 28.8 PG (ref 27–31)
MCHC RBC AUTO-ENTMCNC: 32.4 G/DL (ref 32–36)
MCV RBC AUTO: 89 FL (ref 82–98)
MONOCYTES # BLD AUTO: 0.5 K/UL (ref 0.3–1)
MONOCYTES NFR BLD: 6.9 % (ref 4–15)
NEUTROPHILS # BLD AUTO: 4.2 K/UL (ref 1.8–7.7)
NEUTROPHILS NFR BLD: 58.2 % (ref 38–73)
NRBC BLD-RTO: 0 /100 WBC
PLATELET # BLD AUTO: 288 K/UL (ref 150–450)
PMV BLD AUTO: 10.5 FL (ref 9.2–12.9)
RBC # BLD AUTO: 4.97 M/UL (ref 4.6–6.2)
WBC # BLD AUTO: 7.2 K/UL (ref 3.9–12.7)

## 2024-06-21 PROCEDURE — 85651 RBC SED RATE NONAUTOMATED: CPT | Mod: PO | Performed by: STUDENT IN AN ORGANIZED HEALTH CARE EDUCATION/TRAINING PROGRAM

## 2024-06-21 PROCEDURE — 36415 COLL VENOUS BLD VENIPUNCTURE: CPT | Mod: PO | Performed by: STUDENT IN AN ORGANIZED HEALTH CARE EDUCATION/TRAINING PROGRAM

## 2024-06-21 PROCEDURE — 85025 COMPLETE CBC W/AUTO DIFF WBC: CPT | Mod: PO | Performed by: STUDENT IN AN ORGANIZED HEALTH CARE EDUCATION/TRAINING PROGRAM

## 2024-06-27 NOTE — PROGRESS NOTES
Subjective:      Patient ID: Erickson Decker is a 58 y.o. male.    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  - Positive serologies: YESENIA 1:80 homogenous  - Negative serologies: dsDNA, SSA, SSB, Sm, RNP, RF, CCP  - Pathology:              - Skin, left forearm (12/2023) Psoriasiform dermatitis, consistent with psoriasis  - Infectious screening labs: Negative HIV, hepatitis-B, hepatitis-C, and QuantiFERON (03/26/2024)  - Imaging:              - Xray bilateral hands (11/2021) mild degenerative changes  - Previous Treatments:  - Current Treatments:               - Mobic 15mg daily: helpful with ankles but not hands       - MTX 15mg weekly plus folic acid daily: planning to start [place]  Interval History:   Unfortunately, Otezla was denied. He is still have pain in his hands, but recently injured his back and that has been hurting him more.      Initial History:    He has developed pain and swelling in his hands, wrists, and ankles over the past several years that worsened over the past 2 years. He reports tingling in his fingertips, dry mouth, and difficulty with dexterity. Physical examination shows non tender swelling of both wrists, 2nd and 3rd right MCPs, 2nd to 4th left MCPs, bilateral knees.  I suspect psoriatic arthritis.  He has had a partial response to Mobic.  I think he will benefit from starting Otezla but will complete workup as outlined below 1st.     Objective:   BP (!) 138/91 (BP Location: Left arm, Patient Position: Sitting, BP Method: Large (Automatic))   Pulse 77    "Resp 17   Ht 5' 10" (1.778 m)   Wt 107.3 kg (236 lb 8.9 oz)   BMI 33.94 kg/m²   Physical Exam   Constitutional: normal appearance.   HENT:   Head: Normocephalic and atraumatic.   Cardiovascular: Normal rate, regular rhythm and normal heart sounds.   Pulmonary/Chest: Effort normal and breath sounds normal.   Musculoskeletal:      Comments: Non tender swelling of both wrists, 2nd and 3rd right MCPs, 2nd to 4th left MCPs, bilateral knees   Neurological: He is alert.   Skin: Skin is warm and dry. No rash noted.       No data to display     Assessment:     1. Psoriatic arthritis    2. Psoriasis    3. Acute midline low back pain without sciatica    4. Medication management    5. Drug-induced immunodeficiency    6. High risk medication use      This is a 58 year old man with HTN, HLD, pre-DM, cataracts s/p removal, former smoker, and biopsy proven psoriasis. He has persistent pain and swelling in his hands and knees. He has had only partial response to NSAIDs. Unfortunately, insurance denied Otezla. We will have to try MTX when [place].      Plan:     Problem List Items Addressed This Visit          Derm    Psoriasis    Relevant Medications    methotrexate 2.5 MG Tab    folic acid (FOLVITE) 1 MG tablet    Other Relevant Orders    C-Reactive Protein    CBC Auto Differential    Creatinine, Serum    ALT (SGPT)    AST (SGOT)    Sedimentation rate       Immunology/Multi System    Drug-induced immunodeficiency       Orthopedic    Psoriatic arthritis - Primary    Relevant Medications    methotrexate 2.5 MG Tab    folic acid (FOLVITE) 1 MG tablet    Other Relevant Orders    C-Reactive Protein    CBC Auto Differential    Creatinine, Serum    ALT (SGPT)    AST (SGOT)    Sedimentation rate       Palliative Care    High risk medication use     Other Visit Diagnoses       Acute midline low back pain without sciatica        Relevant Medications    cyclobenzaprine (FLEXERIL) 10 MG tablet    Medication " management        Relevant Orders    C-Reactive Protein    CBC Auto Differential    Creatinine, Serum    ALT (SGPT)    AST (SGOT)    Sedimentation rate          - Start methotrexate 15mg weekly plus folic acid. I discussed potential side effects including infection, blood count abnormalities, liver/kidney abnormalities, pneumonitis, and GI upset. I discussed that methotrexate should be taken only once a week and should be taken with daily folic acid to prevent medication toxicity. I discussed that monthly monitoring will be necessary with each dose increase, and that monitoring will transition to every 3 months once on a stable dose. Hold for infection. The ACR patient information sheet on methotrexate was provided for additional information.  - Flexeril PRN for back pain  - CBC, CMP, ESR, CRP 4 weeks after starting MTX  - Immunizations: mostly up to date, but patient needs PCV 20  - Labs before follow up in 3 months     Follow up 3 months after starting MTX    30 minutes of total time spent on the encounter, which includes face to face time and non-face to face time preparing to see the patient (eg, review of tests), Obtaining and/or reviewing separately obtained history, Documenting clinical information in the electronic or other health record, Independently interpreting results (not separately reported) and communicating results to the patient/family/caregiver, or Care coordination (not separately reported).     This note was prepared with [employer] Direct voice recognition transcription software. Garbled syntax, mangled pronouns, and other bizarre constructions may be attributed to that software system       Bebe Driscoll M.D.  Rheumatology Dept  [place], [place]

## 2024-06-28 ENCOUNTER — OFFICE VISIT (OUTPATIENT)
Dept: RHEUMATOLOGY | Facility: CLINIC | Age: 59
End: 2024-06-28
Payer: OTHER GOVERNMENT

## 2024-06-28 VITALS
BODY MASS INDEX: 33.87 KG/M2 | WEIGHT: 236.56 LBS | DIASTOLIC BLOOD PRESSURE: 91 MMHG | HEIGHT: 70 IN | RESPIRATION RATE: 17 BRPM | SYSTOLIC BLOOD PRESSURE: 138 MMHG | HEART RATE: 77 BPM

## 2024-06-28 DIAGNOSIS — Z79.899 DRUG-INDUCED IMMUNODEFICIENCY: ICD-10-CM

## 2024-06-28 DIAGNOSIS — Z79.899 HIGH RISK MEDICATION USE: ICD-10-CM

## 2024-06-28 DIAGNOSIS — Z79.899 MEDICATION MANAGEMENT: ICD-10-CM

## 2024-06-28 DIAGNOSIS — L40.9 PSORIASIS: ICD-10-CM

## 2024-06-28 DIAGNOSIS — D84.821 DRUG-INDUCED IMMUNODEFICIENCY: ICD-10-CM

## 2024-06-28 DIAGNOSIS — M54.50 ACUTE MIDLINE LOW BACK PAIN WITHOUT SCIATICA: ICD-10-CM

## 2024-06-28 DIAGNOSIS — L40.50 PSORIATIC ARTHRITIS: Primary | ICD-10-CM

## 2024-06-28 PROCEDURE — 99213 OFFICE O/P EST LOW 20 MIN: CPT | Mod: PBBFAC,PO | Performed by: STUDENT IN AN ORGANIZED HEALTH CARE EDUCATION/TRAINING PROGRAM

## 2024-06-28 PROCEDURE — 99999 PR PBB SHADOW E&M-EST. PATIENT-LVL III: CPT | Mod: PBBFAC,,, | Performed by: STUDENT IN AN ORGANIZED HEALTH CARE EDUCATION/TRAINING PROGRAM

## 2024-06-28 RX ORDER — CYCLOBENZAPRINE HCL 10 MG
10 TABLET ORAL 3 TIMES DAILY PRN
Qty: 30 TABLET | Refills: 1 | Status: SHIPPED | OUTPATIENT
Start: 2024-06-28 | End: 2024-07-08

## 2024-06-28 RX ORDER — CYCLOBENZAPRINE HCL 10 MG
10 TABLET ORAL 3 TIMES DAILY PRN
Qty: 30 TABLET | Refills: 1 | Status: SHIPPED | OUTPATIENT
Start: 2024-06-28 | End: 2024-06-28

## 2024-06-28 RX ORDER — METHOTREXATE 2.5 MG/1
15 TABLET ORAL
Qty: 72 TABLET | Refills: 3 | Status: SHIPPED | OUTPATIENT
Start: 2024-06-28 | End: 2025-06-28

## 2024-06-28 RX ORDER — FOLIC ACID 1 MG/1
1 TABLET ORAL DAILY
Qty: 90 TABLET | Refills: 3 | Status: SHIPPED | OUTPATIENT
Start: 2024-06-28

## 2024-07-11 DIAGNOSIS — E78.1 PURE HYPERTRIGLYCERIDEMIA: ICD-10-CM

## 2024-07-11 RX ORDER — ROSUVASTATIN CALCIUM 20 MG/1
20 TABLET, COATED ORAL DAILY
Qty: 90 TABLET | Refills: 3 | Status: SHIPPED | OUTPATIENT
Start: 2024-07-11

## 2024-07-11 NOTE — TELEPHONE ENCOUNTER
Refill Decision Note   Erickson Decker  is requesting a refill authorization.  Brief Assessment and Rationale for Refill:  Approve     Medication Therapy Plan:         Comments:     Note composed:8:36 AM 07/11/2024

## 2024-07-11 NOTE — TELEPHONE ENCOUNTER
No care due was identified.  [facility] Embedded Care Due Messages. Reference number: [id number].   7/11/2024 12:00:53 AM CDT

## 2024-08-25 ENCOUNTER — PATIENT MESSAGE (OUTPATIENT)
Dept: RHEUMATOLOGY | Facility: CLINIC | Age: 59
End: 2024-08-25
Payer: OTHER GOVERNMENT

## 2024-09-25 ENCOUNTER — PATIENT MESSAGE (OUTPATIENT)
Dept: RHEUMATOLOGY | Facility: CLINIC | Age: 59
End: 2024-09-25
Payer: OTHER GOVERNMENT

## 2024-09-26 ENCOUNTER — LAB VISIT (OUTPATIENT)
Dept: LAB | Facility: HOSPITAL | Age: 59
End: 2024-09-26
Payer: OTHER GOVERNMENT

## 2024-09-26 DIAGNOSIS — L40.9 PSORIASIS: ICD-10-CM

## 2024-09-26 DIAGNOSIS — L40.50 PSORIATIC ARTHRITIS: ICD-10-CM

## 2024-09-26 DIAGNOSIS — Z79.899 MEDICATION MANAGEMENT: ICD-10-CM

## 2024-09-26 LAB
ALT SERPL W/O P-5'-P-CCNC: 37 U/L (ref 10–44)
AST SERPL-CCNC: 24 U/L (ref 10–40)
BASOPHILS # BLD AUTO: 0.03 K/UL (ref 0–0.2)
BASOPHILS NFR BLD: 0.4 % (ref 0–1.9)
CREAT SERPL-MCNC: 1.1 MG/DL (ref 0.5–1.4)
CRP SERPL-MCNC: 1.8 MG/L (ref 0–8.2)
DIFFERENTIAL METHOD BLD: NORMAL
EOSINOPHIL # BLD AUTO: 0.3 K/UL (ref 0–0.5)
EOSINOPHIL NFR BLD: 3.7 % (ref 0–8)
ERYTHROCYTE [DISTWIDTH] IN BLOOD BY AUTOMATED COUNT: 13.9 % (ref 11.5–14.5)
ERYTHROCYTE [SEDIMENTATION RATE] IN BLOOD BY PHOTOMETRIC METHOD: 9 MM/HR (ref 0–23)
EST. GFR  (NO RACE VARIABLE): >60 ML/MIN/1.73 M^2
HCT VFR BLD AUTO: 42.3 % (ref 40–54)
HGB BLD-MCNC: 14 G/DL (ref 14–18)
IMM GRANULOCYTES # BLD AUTO: 0.02 K/UL (ref 0–0.04)
IMM GRANULOCYTES NFR BLD AUTO: 0.3 % (ref 0–0.5)
LYMPHOCYTES # BLD AUTO: 2.1 K/UL (ref 1–4.8)
LYMPHOCYTES NFR BLD: 29 % (ref 18–48)
MCH RBC QN AUTO: 29.2 PG (ref 27–31)
MCHC RBC AUTO-ENTMCNC: 33.1 G/DL (ref 32–36)
MCV RBC AUTO: 88 FL (ref 82–98)
MONOCYTES # BLD AUTO: 0.5 K/UL (ref 0.3–1)
MONOCYTES NFR BLD: 7.5 % (ref 4–15)
NEUTROPHILS # BLD AUTO: 4.2 K/UL (ref 1.8–7.7)
NEUTROPHILS NFR BLD: 59.1 % (ref 38–73)
NRBC BLD-RTO: 0 /100 WBC
PLATELET # BLD AUTO: 273 K/UL (ref 150–450)
PMV BLD AUTO: 9.3 FL (ref 9.2–12.9)
RBC # BLD AUTO: 4.79 M/UL (ref 4.6–6.2)
WBC # BLD AUTO: 7.1 K/UL (ref 3.9–12.7)

## 2024-09-26 PROCEDURE — 84460 ALANINE AMINO (ALT) (SGPT): CPT | Mod: PO | Performed by: STUDENT IN AN ORGANIZED HEALTH CARE EDUCATION/TRAINING PROGRAM

## 2024-09-26 PROCEDURE — 36415 COLL VENOUS BLD VENIPUNCTURE: CPT | Mod: PO | Performed by: STUDENT IN AN ORGANIZED HEALTH CARE EDUCATION/TRAINING PROGRAM

## 2024-09-26 PROCEDURE — 86140 C-REACTIVE PROTEIN: CPT | Performed by: STUDENT IN AN ORGANIZED HEALTH CARE EDUCATION/TRAINING PROGRAM

## 2024-09-26 PROCEDURE — 85025 COMPLETE CBC W/AUTO DIFF WBC: CPT | Mod: PO | Performed by: STUDENT IN AN ORGANIZED HEALTH CARE EDUCATION/TRAINING PROGRAM

## 2024-09-26 PROCEDURE — 85652 RBC SED RATE AUTOMATED: CPT | Performed by: STUDENT IN AN ORGANIZED HEALTH CARE EDUCATION/TRAINING PROGRAM

## 2024-09-26 PROCEDURE — 82565 ASSAY OF CREATININE: CPT | Mod: PO | Performed by: STUDENT IN AN ORGANIZED HEALTH CARE EDUCATION/TRAINING PROGRAM

## 2024-09-26 PROCEDURE — 84450 TRANSFERASE (AST) (SGOT): CPT | Mod: PO | Performed by: STUDENT IN AN ORGANIZED HEALTH CARE EDUCATION/TRAINING PROGRAM

## 2024-09-27 ENCOUNTER — PATIENT MESSAGE (OUTPATIENT)
Dept: RHEUMATOLOGY | Facility: CLINIC | Age: 59
End: 2024-09-27
Payer: OTHER GOVERNMENT

## 2024-11-14 ENCOUNTER — OFFICE VISIT (OUTPATIENT)
Dept: FAMILY MEDICINE | Facility: CLINIC | Age: 59
End: 2024-11-14
Payer: OTHER GOVERNMENT

## 2024-11-14 ENCOUNTER — LAB VISIT (OUTPATIENT)
Dept: LAB | Facility: HOSPITAL | Age: 59
End: 2024-11-14
Attending: FAMILY MEDICINE
Payer: OTHER GOVERNMENT

## 2024-11-14 VITALS
RESPIRATION RATE: 14 BRPM | BODY MASS INDEX: 34.16 KG/M2 | DIASTOLIC BLOOD PRESSURE: 64 MMHG | WEIGHT: 238.63 LBS | SYSTOLIC BLOOD PRESSURE: 128 MMHG | OXYGEN SATURATION: 96 % | HEIGHT: 70 IN | HEART RATE: 87 BPM

## 2024-11-14 DIAGNOSIS — E55.9 VITAMIN D DEFICIENCY: Primary | ICD-10-CM

## 2024-11-14 DIAGNOSIS — E55.9 VITAMIN D DEFICIENCY: ICD-10-CM

## 2024-11-14 LAB — 25(OH)D3+25(OH)D2 SERPL-MCNC: 31 NG/ML (ref 30–96)

## 2024-11-14 PROCEDURE — G2211 COMPLEX E/M VISIT ADD ON: HCPCS | Mod: S$PBB,,, | Performed by: FAMILY MEDICINE

## 2024-11-14 PROCEDURE — 99214 OFFICE O/P EST MOD 30 MIN: CPT | Mod: S$PBB,,, | Performed by: FAMILY MEDICINE

## 2024-11-14 PROCEDURE — 99999 PR PBB SHADOW E&M-EST. PATIENT-LVL IV: CPT | Mod: PBBFAC,,, | Performed by: FAMILY MEDICINE

## 2024-11-14 PROCEDURE — 99214 OFFICE O/P EST MOD 30 MIN: CPT | Mod: PBBFAC | Performed by: FAMILY MEDICINE

## 2024-11-14 PROCEDURE — 82306 VITAMIN D 25 HYDROXY: CPT | Performed by: FAMILY MEDICINE

## 2024-11-14 PROCEDURE — 36415 COLL VENOUS BLD VENIPUNCTURE: CPT | Performed by: FAMILY MEDICINE

## 2024-11-14 NOTE — PATIENT INSTRUCTIONS
Jamarcus Almendarez,     If you are due for any health screening(s) below please notify me so we can arrange them to be ordered and scheduled to maintain your health. Most healthy patients complete it. Don't lose out on improving your health.     All of your core healthy metrics are met.                               Thank you for allowing me to be part of your healthcare team at [facility]. It is a pleasure to care for you today.   Please take all of your medications as instructed and follow all new instructions from your visit today.  If you received labs or medical tests today you should hear information about results or scheduling either by phone or mychart within approximately a week.   If you have any questions or concerns please do not hesitate to call. Have a blessed day and I hope to see you again soon.  Darline Grider

## 2024-11-14 NOTE — PROGRESS NOTES
Patient ID: Erickson Decker is a 59 y.o. male.    Chief Complaint: Follow-up    History of Present Illness    CHIEF COMPLAINT:  Erickson presents for a routine medical follow-up and medication refills.    HPI:  Erickson reports ongoing issues with high triglycerides, with the most recent level at 299. He has been taking fenofibrate for this condition but reduced the dose from 145mg to 54mg due to fatigue. His triglycerides remain high despite the medication. Erickson has arthritis, for which he previously took meloxicam but discontinued due to side effects. He is now on a different medication prescribed by his rheumatologist, Dr. Milan. He reports autoimmune problems causing skin issues such as lesions developing when scratched. Erickson has rheumatoid arthritis, treated with Orencia injections and leflunomide. He denies any new or worsening symptoms.    MEDICATIONS:  Erickson is on Mycardis HCTZ for blood pressure control. He is taking Rosuvastatin (Crestor) for cholesterol, which was filled on July 11th for a year. Methotrexate has been prescribed by his rheumatologist. He is also on OTC fish oil and Fenofibrate 54 mg for triglycerides. Erickson is taking Folic acid and Vitamin D 50,000 units once weekly.    MEDICAL HISTORY:  Erickson has a history of hypertension, hyperlipidemia, and rheumatoid arthritis. Erickson recently received a flu vaccine. He reports being up to date on vaccines due to work requirements.    FAMILY HISTORY:  Family history is unremarkable       TEST RESULTS:  Erickson's total cholesterol on May 14, 2024, was 144 (HDL 30, LDL 54, triglycerides 299). In May 2024, his Vitamin D level was 36 (low side of normal), Vitamin B12 was 627, and A1C was 6.1%. TSH, PSA, and Microalbumin/creatinine ratio tests were also conducted in May 2024. CBC and CMP were performed in September 2023. Erickson underwent an EKG, but the date was not specified.    IMAGING:  Erickson had a CT, presumed to be cardiac, 2-3 years ago, which he reports  as normal. He also had a chest XR, but the date was not specified.    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 He is a [employer]  who was discharged due to health issues, specifically high triglycerides.      ROS:  ROS as indicated in HPI.         Physical Exam    General: No acute distress. Well-developed. Well-nourished.  Eyes: EOMI. Sclerae anicteric.  HENT: Normocephalic. Atraumatic. Nares patent. Moist oral mucosa.  Cardiovascular: Regular rate. Regular rhythm. No murmurs. No rubs. No gallops. Normal S1, S2. Normal heart sounds. No carotid bruits.  Respiratory: Normal respiratory effort. Clear to auscultation bilaterally. No rales. No rhonchi. No wheezing. Normal lung sounds.  Musculoskeletal: No  obvious deformity.  Extremities: No lower extremity edema.  Neurological: Alert & oriented x3. No slurred speech. Normal gait.  Psychiatric: Normal mood. Normal affect. Good insight. Good judgment.  Skin: Warm. Dry. No rash.         Assessment & Plan    Assessed lipid panel: elevated triglycerides (299 mg/dL) and low HDL (30 mg/dL)  Evaluated vitamin D levels, currently at lower end of normal range (36 ng/mL)  Reviewed recent work physical results, awaiting formal lab reports for comprehensive analysis  Considered increasing fenofibrate dose to address elevated triglycerides, weighing potential benefits against previous side effects of fatigue  Reviewed cardiovascular risk factors, including family history and previous cardiac imaging results (reportedly normal)    MIXED HYPERLIPIDEMIA:  Explained cholesterol formula breakdown: total cholesterol = HDL + LDL + 20% of triglycerides.  Discussed cardiovascular risk indicator (total cholesterol / HDL), noting patient's current value is below the elevated-risk threshold of 5.  Explained importance of HDL levels and potential benefits of consuming healthy fats (e.g., avocados, high-quality olive  oil).  Consider purchasing high-quality olive oil from specialty stores for potential health benefits.  Increased fenofibrate from 54 mg to 108 mg daily (patient to use existing supply of 54 mg tablets).  Continued rosuvastatin (Crestor) for cholesterol management.  Continued OTC fish oil supplement.  Take cholesterol medications at night for better absorption.    ESSENTIAL HYPERTENSION:  Continued MyCardis HCTZ for blood pressure control.    VITAMIN D DEFICIENCY:  Continued weekly vitamin D 50,000 units.  Vitamin D level recheck ordered.    IMMUNIZATION:  Discussed new RSV vaccine recommendation for older adults.  Scanned vaccination card into patient portal to update immunization records.    CARDIOVASCULAR SCREENING:  Consider cardiology referral pending review of previous cardiac imaging results.    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  Follow up to review lab results from recent work physical once received.         Plan:          Vitamin D deficiency  -     Vitamin D; Future; Expected date: 05/14/2025        Follow up in about 6 months (around 5/14/2025), or if symptoms worsen or fail to improve.    This note was generated with the assistance of ambient listening technology. Verbal consent was obtained by the patient and accompanying visitor(s) for the recording of patient appointment to facilitate this note. I attest to having reviewed and edited the generated note for accuracy, though some syntax or spelling errors may persist. Please contact the author of this note for any clarification.

## 2024-11-17 ENCOUNTER — PATIENT MESSAGE (OUTPATIENT)
Dept: FAMILY MEDICINE | Facility: CLINIC | Age: 59
End: 2024-11-17
Payer: OTHER GOVERNMENT

## 2024-11-17 DIAGNOSIS — Z13.6 ENCOUNTER FOR SCREENING FOR CARDIOVASCULAR DISORDERS: Primary | ICD-10-CM

## 2024-11-17 DIAGNOSIS — E78.2 MIXED HYPERLIPIDEMIA: ICD-10-CM

## 2024-12-18 ENCOUNTER — PATIENT MESSAGE (OUTPATIENT)
Dept: FAMILY MEDICINE | Facility: CLINIC | Age: 59
End: 2024-12-18
Payer: OTHER GOVERNMENT

## 2025-03-18 ENCOUNTER — TELEPHONE (OUTPATIENT)
Dept: FAMILY MEDICINE | Facility: CLINIC | Age: 60
End: 2025-03-18
Payer: OTHER GOVERNMENT

## 2025-03-18 DIAGNOSIS — Z01.00 ENCOUNTER FOR EYE EXAM IN PATIENT WITH TYPE 2 DIABETES MELLITUS: Primary | ICD-10-CM

## 2025-03-18 DIAGNOSIS — E11.9 ENCOUNTER FOR EYE EXAM IN PATIENT WITH TYPE 2 DIABETES MELLITUS: Primary | ICD-10-CM

## 2025-03-18 NOTE — TELEPHONE ENCOUNTER
----- Message from Kyler sent at 3/18/2025  9:40 AM CDT -----  Regarding: Referral  Type:  Patient Requesting ReferralWho Called:[facility] Does the patient already have the specialty appointment scheduled?:yes If yes, what is the date of that appointment?:03/20Referral to What Specialty:OptReason for Referral:diagnostic testing Does the patient want the referral with a specific physician?:Dr. Yaya Terry the specialist an [facility] or [facility]?:nonPatient Requesting a Response?:noWould the patient rather a call back or a response via [facility]? Call back Best Call Back Number:[phone] ask for Mary , [phone] fax Additional Information: [facility] needs referral for eye exam, office has been trying to obtain since January   please call to advise, Thank You.

## 2025-03-18 NOTE — TELEPHONE ENCOUNTER
----- Message from Kyler sent at 3/18/2025  9:40 AM CDT -----  Regarding: Referral  Type:  Patient Requesting ReferralWho Called:[facility] Does the patient already have the specialty appointment scheduled?:yes If yes, what is the date of that appointment?:03/20Referral to What Specialty:OptReason for Referral:diagnostic testing Does the patient want the referral with a specific physician?:Dr. Yaya Terry the specialist an [facility] or [facility]?:nonPatient Requesting a Response?:noWould the patient rather a call back or a response via [facility]? Call back Best Call Back Number:[phone] ask for Mary , [phone] fax Additional Information: [facility] needs referral for eye exam, office has been trying to obtain since January   please call to advise, Thank You.

## 2025-03-18 NOTE — TELEPHONE ENCOUNTER
Returned call to Mary mills/ [facility]  Pt scheduled  on 3/21/25for Diagnostic testing CPT [id number]   Referral needed and needs submitted to Jean

## 2025-03-20 ENCOUNTER — PATIENT MESSAGE (OUTPATIENT)
Dept: FAMILY MEDICINE | Facility: CLINIC | Age: 60
End: 2025-03-20
Payer: OTHER GOVERNMENT

## 2025-03-31 ENCOUNTER — PATIENT OUTREACH (OUTPATIENT)
Dept: ADMINISTRATIVE | Facility: HOSPITAL | Age: 60
End: 2025-03-31
Payer: OTHER GOVERNMENT

## 2025-03-31 NOTE — PROGRESS NOTES
Population Health Chart Review & Patient Outreach Details      Additional [facility] Health Notes:               Updates Requested / Reviewed:      Updated Care Coordination Note, Care Everywhere, , Care Team Updated, and Immunizations Reconciliation Completed or Queried: [facility] Topics Overdue:      [facility] Score: 0     Patient is not due for any topics at this time.    Pneumonia Vaccine  Tetanus Vaccine  Shingles/Zoster Vaccine  RSV Vaccine                  Health Maintenance Topic(s) Outreach Outcomes & Actions Taken:    Eye Exam - Outreach Outcomes & Actions Taken  : External Records Requested & Care Team Updated if Applicable

## 2025-03-31 NOTE — LETTER
AUTHORIZATION FOR RELEASE OF   CONFIDENTIAL INFORMATION    Dear [facility],    We are seeing Erickson Decker, date of birth 1965, in the clinic at [facility] 2ND FLOOR FAM[facility]ICINE. Darline Grider MD is the patient's PCP. Erickson Decker has an outstanding lab/procedure at the time we reviewed his chart. In order to help keep his health information updated, he has authorized us to request the following medical record(s):                                               ( X )  EYE EXAM REPORT ON 2025              Diabetic EYE EXAM           Please include the actual eye exam report along with the significant findings:    ________ No Diabetic Retinopathy  ________ Minimal Background Diabetic Retinopathy  ________ Moderate to Severe Background Diabetic Retinopathy  ________ Clinically Significant Macular Edema  ________ Proliferative Diabetic Retinopathy               Please fax records to [facility], Cole, Elizabeth D, MD, [phone].         Thank you  Deepthi Aparicio LPN  Clinical Care Coordinator  [facility]               Patient Name: Erickson Decker  : 1965  Patient Phone #: [phone]                        Erickson Decker  MRN: [id number]  : 1965  Age: 59 y.o.  Sex: male         Patient/Legal Guardian Signature  This signature was collected at 2024           _______________________________   Printed Name/Relationship to Patient      Consent for Examination and Treatment: I hereby authorize the providers and employees of [facility] ([facility]) to provide medical treatment/services which includes, but is not limited to, performing and administering tests and diagnostic procedures that are deemed necessary, including, but not limited to, imaging examinations, blood tests and other laboratory procedures as may be required by the hospital, clinic, or may be ordered by my physician(s) or persons working under the general and/or special  instructions of my physician(s).      I understand and agree that this consent covers all authorized persons, including but not limited to physicians, residents, nurse practitioners, physicians' assistants, specialists, consultants, student nurses, and independently contracted physicians, who are called upon by the physician in charge, to carry out the diagnostic procedures and medical or surgical treatment.     I hereby authorize [facility] to retain or dispose of any specimens or tissue, should there be such remaining from any test or procedure.     I hereby authorize and give consent for [facility] providers and employees to take photographs, images or videotapes of such diagnostic, surgical or treatment procedures of Patient as may be required by [facility] or as may be ordered by a physician. I further acknowledge and agree that [facility] may use cameras or other devices for patient monitoring.     I am aware that the practice of medicine is not an exact science, and I acknowledge that no guarantees have been made to me as to the outcome of any tests, procedures or treatment.     Authorization for Release of Information: I understand that my insurance company and/or their agents may need information necessary to make determinations about payment/reimbursement. I hereby provide authorization to release to all insurance companies, their successors, assignees, other parties with whom they may have contracted, or others acting on their behalf, that are involved with payment for any hospital and/or clinic charges incurred by the patient, any information that they request and deem necessary for payment/reimbursement, and/or quality review.  I further authorize the release of my health information to physicians or other health care practitioners on staff who are involved in my health care now and in the future, and to other health care providers, entities, or institutions for the purpose of my continued care and treatment,  including referrals.     REGISTRATION AUTHORIZATION  Form No. 11819 (Rev. 3/25/2024)    Page 1 of 3                       Medicare Patient's Certification and Authorization to Release Information and Payment Request:  I certify that the information given by me in applying for payment under Title XVIII of the Social Security Act is correct. I authorize any garza of medical or other information about me to release to the [facility], or its intermediaries or carriers, any information needed for this or a related Medicare claim. I request that payment of authorized benefits be made on my behalf.     Assignment of Insurance Benefits:   I hereby authorize any and all insurance companies, health plans, defined   benefit plans, health insurers or any entity that is or may be responsible for payment of my medical expenses to pay all hospital and medical benefits now due, and to become due and payable to me under any hospital benefits, sick benefits, injury benefits or any other benefit for services rendered to me, including Major Medical Benefits, direct to [facility] and all independently contracted physicians. I assign any and all rights that I may have against any and all insurance companies, health plans, defined benefit plans, health insurers or any entity that is or may be responsible for payment of my medical expenses, including, but not limited to any right to appeal a denial of a claim, any right to bring any action, lawsuit, administrative proceeding, or other cause of action on my behalf. I specifically assign my right to pursue litigation against any and all insurance companies, health plans, defined benefit plans, health insurers or any entity that is or may be responsible for payment of my medical expenses based upon a refusal to pay charges.            E. Valuables: It is understood and agreed that [facility] is not liable for the damage to or loss of any money, jewelry,   documents, dentures,  eye glasses, hearing aids, prosthetics, or other property of value.     F. Computer Equipment: I understand and agree that should I choose to use computer equipment owned by [facility] or if I choose to access the Internet via [facility]s network, I do so at my own risk. [facility] is not responsible for any damage to my computer equipment or to any damages of any type that might arise from my loss of equipment or data.     G. Acceptance of Financial Responsibility:  I agree that in consideration of the services and   supplies that have been   or will be furnished to the patient, I am hereby obligated to pay all charges made for or on the account of the patient according to the standard rates (in effect at the time the services and supplies are delivered) established by [facility], including its Patient Financial Assistance Policy to the extent it is applicable. I understand that I am responsible for all charges, or portions thereof, not covered by insurance or other sources. Patient refunds will be distributed only after balances at all [facility] facilities are paid.     H. Communication Authorization:  I hereby authorize [facility] and its representatives, along with any billing service   or  who may work on their behalf, to contact me on   my cell phone and/or home phone using pre- recorded messages, artificial voice messages, automatic telephone dialing devices or other computer assisted technology, or by electronic      mail, text messaging, or by any other form of electronic communication. This includes, but is not limited to, appointment reminders, yearly physical exam reminders, preventive care reminders, patient campaigns, welcome calls, and calls about account balances on my account or any account on which I am listed as a guarantor. I understand I have the right to opt out of these communications at any time.      Relationship  Between  Facility and  Provider:      I understand that some, but not all,  providers furnishing services to the patient are not employees or agents of [facility]. The patient is under the care and supervision of his/her attending physician, and it is the responsibility of the facility and its nursing staff to carry out the instructions of such physicians. It is the responsibility of the patient's physician/designee to obtain the patient's informed consent, when required, for medical or surgical treatment, special diagnostic or therapeutic procedures, or hospital services rendered for the patient under the special instructions of the physician/designee.           REGISTRATION AUTHORIZATION  Form No. 87728 (Rev. 3/25/2024)    Page 2 of 3                       Immunizations: [facility] shares immunization information with state sponsored health departments to help you and your doctor keep track of your immunization records. By signing, you consent to have this information shared with the health department in your state:                                [place] - LINKS ([place] Immunization Network for Kids Statewide)                                [place] - MIIX ([place] Immunization Information eXchange)                                [place] - ImmPRINT (Immunization Patient Registry with Integrated Technology)     TERM: This authorization is valid for this and subsequent care/treatment I receive at [facility] and will remain valid unless/until revoked in writing by me.     [facility]: As used in this document, [facility] means all [facility] owned and managed facilities, including, but not limited to, all health centers, surgery centers, clinics, urgent care centers, and hospitals.         [facility] complies with applicable Federal civil rights laws and does not discriminate on the basis of race, color, national origin, age, disability, or sex.  ATENCIÓN: si habla español, tiene a espinal disposición servicios gratuitos de asistencia lingüística. Llame al  [phone].  CLARENCE Ý: N?u b?n nói Ti?ng Vi?t, có các d?ch v? h? tr? ngôn ng? mi?n phí dành cho b?n. G?i s? [phone].        REGISTRATION AUTHORIZATION  Form No. 86335 (Rev. 3/25/2024)   Page 3 of 3

## 2025-04-07 DIAGNOSIS — L40.9 PSORIASIS: ICD-10-CM

## 2025-04-07 DIAGNOSIS — L40.50 PSORIATIC ARTHRITIS: ICD-10-CM

## 2025-04-07 RX ORDER — METHOTREXATE 2.5 MG/1
15 TABLET ORAL
Qty: 72 TABLET | Refills: 3 | Status: SHIPPED | OUTPATIENT
Start: 2025-04-07 | End: 2026-04-07

## 2025-04-07 NOTE — TELEPHONE ENCOUNTER
Pharmacy requesting refill on METHOTREXATE TABS 2.5MG  Pt's LOV 6/28/2025  Pt's NOV NONE SCHEDULED   Medication pending

## 2025-04-21 DIAGNOSIS — L40.50 PSORIATIC ARTHRITIS: ICD-10-CM

## 2025-04-21 DIAGNOSIS — L40.9 PSORIASIS: ICD-10-CM

## 2025-04-21 RX ORDER — FOLIC ACID 1 MG/1
1 TABLET ORAL DAILY
Qty: 90 TABLET | Refills: 3 | Status: SHIPPED | OUTPATIENT
Start: 2025-04-21

## 2025-04-21 NOTE — TELEPHONE ENCOUNTER
Pharmacy requesting refill on FOLIC ACID 1MG  Pt's LOV 6/28/2024  Pt's NOV NONE SCHEDULED   Medication pending

## 2025-05-07 ENCOUNTER — PATIENT MESSAGE (OUTPATIENT)
Dept: FAMILY MEDICINE | Facility: CLINIC | Age: 60
End: 2025-05-07
Payer: OTHER GOVERNMENT

## 2025-05-14 ENCOUNTER — OFFICE VISIT (OUTPATIENT)
Dept: FAMILY MEDICINE | Facility: CLINIC | Age: 60
End: 2025-05-14
Payer: OTHER GOVERNMENT

## 2025-05-14 ENCOUNTER — HOSPITAL ENCOUNTER (OUTPATIENT)
Dept: RADIOLOGY | Facility: HOSPITAL | Age: 60
Discharge: HOME | End: 2025-05-14
Attending: FAMILY MEDICINE
Payer: OTHER GOVERNMENT

## 2025-05-14 ENCOUNTER — RESULTS FOLLOW-UP (OUTPATIENT)
Dept: FAMILY MEDICINE | Facility: CLINIC | Age: 60
End: 2025-05-14

## 2025-05-14 VITALS
SYSTOLIC BLOOD PRESSURE: 114 MMHG | OXYGEN SATURATION: 98 % | DIASTOLIC BLOOD PRESSURE: 78 MMHG | BODY MASS INDEX: 33.53 KG/M2 | WEIGHT: 234.19 LBS | HEART RATE: 92 BPM | RESPIRATION RATE: 15 BRPM | HEIGHT: 70 IN

## 2025-05-14 DIAGNOSIS — E78.1 PURE HYPERTRIGLYCERIDEMIA: ICD-10-CM

## 2025-05-14 DIAGNOSIS — R05.1 ACUTE COUGH: ICD-10-CM

## 2025-05-14 DIAGNOSIS — E55.9 VITAMIN D DEFICIENCY: ICD-10-CM

## 2025-05-14 DIAGNOSIS — M25.511 CHRONIC RIGHT SHOULDER PAIN: ICD-10-CM

## 2025-05-14 DIAGNOSIS — Z12.5 SCREENING FOR PROSTATE CANCER: ICD-10-CM

## 2025-05-14 DIAGNOSIS — G89.29 CHRONIC RIGHT SHOULDER PAIN: ICD-10-CM

## 2025-05-14 DIAGNOSIS — M25.511 CHRONIC RIGHT SHOULDER PAIN: Primary | ICD-10-CM

## 2025-05-14 DIAGNOSIS — Z13.6 ENCOUNTER FOR SCREENING FOR CARDIOVASCULAR DISORDERS: ICD-10-CM

## 2025-05-14 DIAGNOSIS — I10 ESSENTIAL HYPERTENSION: ICD-10-CM

## 2025-05-14 DIAGNOSIS — R73.03 PREDIABETES: Primary | ICD-10-CM

## 2025-05-14 DIAGNOSIS — G89.29 CHRONIC RIGHT SHOULDER PAIN: Primary | ICD-10-CM

## 2025-05-14 DIAGNOSIS — E78.2 MIXED HYPERLIPIDEMIA: ICD-10-CM

## 2025-05-14 DIAGNOSIS — Z00.00 ROUTINE MEDICAL EXAM: ICD-10-CM

## 2025-05-14 LAB
CTP QC/QA: YES
SARS-COV-2 RDRP RESP QL NAA+PROBE: NEGATIVE

## 2025-05-14 PROCEDURE — 99999PBSHW: Mod: PBBFAC,,,

## 2025-05-14 PROCEDURE — 99214 OFFICE O/P EST MOD 30 MIN: CPT | Mod: PBBFAC,25 | Performed by: FAMILY MEDICINE

## 2025-05-14 PROCEDURE — 87635 SARS-COV-2 COVID-19 AMP PRB: CPT | Mod: PBBFAC | Performed by: FAMILY MEDICINE

## 2025-05-14 PROCEDURE — 99999 PR PBB SHADOW E&M-EST. PATIENT-LVL IV: CPT | Mod: PBBFAC,,, | Performed by: FAMILY MEDICINE

## 2025-05-14 PROCEDURE — 73030 X-RAY EXAM OF SHOULDER: CPT | Mod: TC,RT

## 2025-05-14 PROCEDURE — 73030 X-RAY EXAM OF SHOULDER: CPT | Mod: 26,RT,, | Performed by: RADIOLOGY

## 2025-05-14 RX ORDER — ERGOCALCIFEROL 1.25 MG/1
50000 CAPSULE ORAL
Qty: 13 CAPSULE | Refills: 3 | Status: SHIPPED | OUTPATIENT
Start: 2025-05-14

## 2025-05-14 RX ORDER — MELOXICAM 15 MG/1
15 TABLET ORAL DAILY
Qty: 90 TABLET | Refills: 1 | Status: SHIPPED | OUTPATIENT
Start: 2025-05-14

## 2025-05-14 RX ORDER — DICLOFENAC SODIUM 75 MG/1
75 TABLET, DELAYED RELEASE ORAL 2 TIMES DAILY
Qty: 30 TABLET | Refills: 1 | Status: SHIPPED | OUTPATIENT
Start: 2025-05-14 | End: 2025-05-14

## 2025-05-14 NOTE — PROGRESS NOTES
Patient ID: Erickson Decker is a 59 y.o. male.    Chief Complaint: Follow-up (6 month follow up) and Sinus Problem (Patient states got it from co worker and refused to be tested for covid and flu)    History of Present Illness    CHIEF COMPLAINT:  Erickson presents today for follow up    UPPER RESPIRATORY SYMPTOMS:  He reports onset of sinus symptoms since yesterday morning without fever. He notes exposure to similar illness among multiple coworkers.    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 Pain is located centrally in the shoulder with radiation to neck when severe, exacerbated by mouse and keyboard use, becoming particularly severe by end of day. He denies taking any medications for shoulder pain management. He previously took Meloxicam 15mg daily for wrist pain.    OPHTHALMOLOGIC:  He reports a recent episode of chalazion with significant facial swelling around the affected eye and lip formation on the inside of the eyelid. The condition was not surgically addressed.    MEDICATIONS:  He takes arthritis medication consisting of six pills weekly and Vitamin D supplementation weekly on Wednesdays.    LABS AND PREVENTIVE CARE:  Last labs were performed in September, with vitamin D checked in November. Cholesterol panel from May of previous year showed triglycerides of 299 mg/dL. He has never had a colonoscopy but completed Cologuard test last year.      ROS:  ROS as indicated in HPI.         Physical Exam  Constitutional:       Appearance: Normal appearance.   HENT:      Head: Normocephalic and atraumatic.      Nose: Nose normal.   Eyes:      Extraocular Movements: Extraocular movements intact.      Pupils: Pupils are equal, round, and reactive to light.   Cardiovascular:      Rate and Rhythm: Normal rate and regular rhythm.      Pulses: Normal pulses.   Pulmonary:      Effort: Pulmonary effort is normal. No respiratory  distress.      Breath sounds: Normal breath sounds. No wheezing or rhonchi.   Musculoskeletal:         General: Normal range of motion.      Comments: Tenderness along the lateral deltoid of the right upper arm   Skin:     General: Skin is warm and dry.   Neurological:      General: No focal deficit present.      Mental Status: He is alert and oriented to person, place, and time.   Psychiatric:         Mood and Affect: Mood normal.         Behavior: Behavior normal.         Thought Content: Thought content normal.          Assessment & Plan    J01.90 Acute sinusitis, unspecified  Z20.822 Contact with and (suspected) exposure to COVID-19  M25.511 Pain in right shoulder  S43.401A Unspecified sprain of right shoulder joint, initial encounter  H00.15 Chalazion left lower eyelid  H66.93 Otitis media, unspecified, bilateral  E78.1 Pure hyperglyceridemia  E55.9 Vitamin D deficiency, unspecified  Z79.1 Long term (current) use of non-steroidal anti-inflammatories (NSAID)    ACUTE SINUSITIS:  - Erickson reports rhinorrhea and sinus problems since yesterday morning without fever, indicating sinus problems without systemic symptoms.      COVID-19 EXPOSURE:  - Ordered COVID-19 test due to recent onset of sinus symptoms and potential exposure risk.  - Results typically available within 10-15 minutes.    RIGHT SHOULDER PAIN AND SPRAIN:  - Erickson reports chronic right shoulder pain since early February, potentially related to a work incident involving pulling a heavy line.  - Pain is centrally located in the shoulder, sometimes radiating to the neck, and exacerbated by mouse and keyboard use.  - Ordered XR Right Shoulder to evaluate for potential structural damage.    CHALAZION LEFT LOWER EYELID:  - Erickson reports a chalazion on the left lower eyelid with previous swelling around the eyeball and lip formation on the inside.  - Noted that the chalazion has healed.    OTITIS MEDIA:  - Observed fluid in the ears, which can cause dullness  "and darkness inside the ear.    HYPERGLYCERIDEMIA:  - Erickson's last cholesterol test was in May of last year with elevated triglycerides at 299.  - Ordered lipid panel to monitor current cholesterol and triglyceride levels.    VITAMIN D DEFICIENCY:  - Erickson's last vitamin D test was in November, with levels at the lower limit for a few years.  - Currently taking vitamin D once weekly on Wednesdays.  - Ordered vitamin D level recheck to determine if dosage adjustment is needed.  - Will continue current supplementation pending lab results.    LONG-TERM USE OF NSAIDS:  - Prescribed Meloxicam (Mobic) for shoulder and wrist joint pain.  - Erickson previously took Meloxicam but stopped due to drug testing concerns.  - Clarified that Meloxicam is not a controlled substance and should not interfere with required drug testing.  - Advised against taking NSAIDs on an empty stomach due to potential gastric irritation.  - Explained that arthritis medications can lower immunity, potentially increasing susceptibility to infections.    FOLLOW-UP:  - Follow up for lab work.         Review of patient's allergies indicates:   Allergen Reactions    Bupropion Hives      Vitals:    05/14/25 1123   BP: 114/78   BP Location: Right arm   Patient Position: Sitting   Pulse: 92   Resp: 15   SpO2: 98%   Weight: 106.2 kg (234 lb 3.2 oz)   Height: 5' 10" (1.778 m)           Results for orders placed or performed in visit on 05/14/25   POCT COVID-19 Rapid Screening    Collection Time: 05/14/25 12:11 PM   Result Value Ref Range    POC Rapid COVID Negative Negative     Acceptable Yes       Plan:          Prediabetes  -     Hemoglobin A1c; Future; Expected date: 05/14/2025    Pure hypertriglyceridemia  -     Lipid Panel; Future; Expected date: 05/14/2025    Encounter for screening for cardiovascular disorders  -     Lipid Panel; Future; Expected date: 05/14/2025    Mixed hyperlipidemia  -     Lipid Panel; Future; Expected date: " 05/14/2025    Vitamin D deficiency  -     ergocalciferol (ERGOCALCIFEROL) 50,000 unit Cap; Take 1 capsule (50,000 Units total) by mouth every 7 days.  Dispense: 13 capsule; Refill: 3  -     Vitamin D; Future; Expected date: 05/14/2025    Essential hypertension  -     Microalbumin/Creatinine Ratio, Urine; Future; Expected date: 05/14/2025  -     CBC Auto Differential; Future; Expected date: 05/14/2025  -     Comprehensive Metabolic Panel; Future; Expected date: 05/14/2025  -     TSH; Future; Expected date: 05/14/2025    Screening for prostate cancer  -     PSA, Screening; Future; Expected date: 05/14/2025    Routine medical exam  -     Microalbumin/Creatinine Ratio, Urine; Future; Expected date: 05/14/2025  -     Vitamin D; Future; Expected date: 05/14/2025  -     Vitamin B12; Future; Expected date: 05/14/2025  -     CBC Auto Differential; Future; Expected date: 05/14/2025  -     Comprehensive Metabolic Panel; Future; Expected date: 05/14/2025  -     Hemoglobin A1C; Future; Expected date: 05/14/2025  -     TSH; Future; Expected date: 05/14/2025  -     PSA, Screening; Future; Expected date: 05/14/2025    Acute cough  -     POCT COVID-19 Rapid Screening    Chronic right shoulder pain  -     X-ray Shoulder 2 or More Views Right; Future; Expected date: 05/14/2025  -     Discontinue: diclofenac (VOLTAREN) 75 MG EC tablet; Take 1 tablet (75 mg total) by mouth 2 (two) times daily.  Dispense: 30 tablet; Refill: 1  -     meloxicam (MOBIC) 15 MG tablet; Take 1 tablet (15 mg total) by mouth once daily.  Dispense: 90 tablet; Refill: 1    In excessive of 40 minutes total time spent for evaluation and management services. Time included elements of the following: time spent preparing to see patient, obtaining and reviewing separately obtained history, exam, evaluation, counseling and education of patient/family member or care giver, documenting in the HMR, independently interpreting results and communication of results,  coordination of care ordering medications, tests, or procedures, referral and communication to other health care professionals.      Follow up in about 6 months (around 11/14/2025), or if symptoms worsen or fail to improve.    This note was generated with the assistance of ambient listening technology. Verbal consent was obtained by the patient and accompanying visitor(s) for the recording of patient appointment to facilitate this note. I attest to having reviewed and edited the generated note for accuracy, though some syntax or spelling errors may persist. Please contact the author of this note for any clarification.

## 2025-05-26 ENCOUNTER — OFFICE VISIT (OUTPATIENT)
Dept: ORTHOPEDICS | Facility: CLINIC | Age: 60
End: 2025-05-26
Payer: OTHER GOVERNMENT

## 2025-05-26 VITALS — RESPIRATION RATE: 16 BRPM | HEIGHT: 70 IN | BODY MASS INDEX: 33.52 KG/M2 | WEIGHT: 234.13 LBS

## 2025-05-26 DIAGNOSIS — M75.101 TEAR OF RIGHT ROTATOR CUFF, UNSPECIFIED TEAR EXTENT, UNSPECIFIED WHETHER TRAUMATIC: ICD-10-CM

## 2025-05-26 DIAGNOSIS — G89.29 CHRONIC RIGHT SHOULDER PAIN: ICD-10-CM

## 2025-05-26 DIAGNOSIS — M25.511 CHRONIC RIGHT SHOULDER PAIN: ICD-10-CM

## 2025-05-26 DIAGNOSIS — M25.511 RIGHT SHOULDER PAIN, UNSPECIFIED CHRONICITY: Primary | ICD-10-CM

## 2025-05-26 DIAGNOSIS — M75.100 ROTATOR CUFF SYNDROME, UNSPECIFIED LATERALITY: Primary | ICD-10-CM

## 2025-05-26 PROCEDURE — 99204 OFFICE O/P NEW MOD 45 MIN: CPT | Mod: S$PBB,,, | Performed by: ORTHOPAEDIC SURGERY

## 2025-05-26 PROCEDURE — 99999 PR PBB SHADOW E&M-EST. PATIENT-LVL III: CPT | Mod: PBBFAC,,, | Performed by: ORTHOPAEDIC SURGERY

## 2025-05-26 PROCEDURE — 99213 OFFICE O/P EST LOW 20 MIN: CPT | Mod: PBBFAC,PO | Performed by: ORTHOPAEDIC SURGERY

## 2025-05-26 NOTE — PROGRESS NOTES
Past Medical History:   Diagnosis Date    Essential hypertension 12/17/2020    Mixed hyperlipidemia 12/17/2020    Multiple joint pain 12/17/2020    Prediabetes 12/17/2020       Past Surgical History:   Procedure Laterality Date    ADENOIDECTOMY      APPENDECTOMY  1992    CIRCUMCISION      MANDIBLE SURGERY         Current Outpatient Medications   Medication Sig    ergocalciferol (ERGOCALCIFEROL) 50,000 unit Cap Take 1 capsule (50,000 Units total) by mouth every 7 days.    fenofibrate (TRICOR) 54 MG tablet TAKE 1 TABLET DAILY    folic acid (FOLVITE) 1 MG tablet Take 1 tablet (1 mg total) by mouth once daily.    meloxicam (MOBIC) 15 MG tablet Take 1 tablet (15 mg total) by mouth once daily.    methotrexate 2.5 MG Tab Take 6 tablets (15 mg total) by mouth every 7 days.    omega-3 fatty acids/fish oil (FISH OIL-OMEGA-3 FATTY ACIDS) 300-1,000 mg capsule Take by mouth once daily.    roflumilast (ZORYVE) 0.3 % Crea Apply 1 application  topically once daily. Use on ears, face and genitalia    rosuvastatin (CRESTOR) 20 MG tablet Take 1 tablet (20 mg total) by mouth once daily.    telmisartan-hydrochlorothiazide (MICARDIS HCT) 40-12.5 mg per tablet TAKE 1 TABLET DAILY     No current facility-administered medications for this visit.       Review of patient's allergies indicates:   Allergen Reactions    Bupropion Hives       Family History   Problem Relation Name Age of Onset    Arrhythmia Mother      Heart attack Mother      Ulcers Father      Prostate cancer Neg Hx      Colon cancer Neg Hx      Diabetes Neg Hx         Social History[1]    Chief Complaint: No chief complaint on file.      History of present illness:  59-year-old right-hand-dominant male seen for right shoulder injury that occurred back in January.  Patient was pulling some lines and fell causing a jerking to his right arm.  Pain is not improved since then.  Pain gets worse as the day goes on.  He has tried meloxicam.  Pain is a 3/10.  Hurts to reach up above  his head or behind his back.    Prior treatment:  Mobic        Review of Systems:    Constitution: Negative for chills, fever, and sweats.  Negative for unexplained weight loss.    HENT:  Negative for headaches and blurry vision.    Cardiovascular:Negative for chest pain or irregular heart beat. Negative for hypertension.    Respiratory:  Negative for cough and shortness of breath.    Gastrointestinal: Negative for abdominal pain, heartburn, melena, nausea, and vomitting.    Genitourinary:  Negative bladder incontinence and dysuria.    Musculoskeletal:  See HPI    Neurological: Negative for numbness.    Psychiatric/Behavioral: Negative for depression.  The patient is not nervous/anxious.      Endocrine: Negative for polyuria    Hematologic/Lymphatic: Negative for bleeding problem.  Does not bruise/bleed easily.    Skin: Negative for poor would healing and rash      Physical Examination:    Vital Signs:  There were no vitals filed for this visit.    There is no height or weight on file to calculate BMI.    This a well-developed, well nourished patient in no acute distress.  They are alert and oriented and cooperative to examination.  Pt. walks without an antalgic gait.      Examination of the right shoulder shows no rashes or erythema. There are no masses, ecchymosis, or atrophy. The patient has full range of motion in forward flexion, external rotation, and internal rotation to the mid T-spine. The patient has positive Dumont and Neer test - [place]'s test. - Speeds test. Nontender to palpation over a.c. joint. Normal stability anteriorly, posteriorly, and negative sulcus sign. Passive range of motion: Forward flexion of 180°, external rotation at 90° of 90°, internal rotation of 50°, and external rotation at 0° of 50°. 2+ radial pulse. Intact axillary, radial, median and ulnar sensation.  4+ out of 5 resisted forward flexion, external rotation, and negative lift off test.      X-rays:  X-rays of the right shoulder  available for review which show some mild degenerative changes of the glenohumeral joint         Assessment:  Right rotator cuff injury    Plan:  I reviewed the findings with him today.  I recommended an MRI to evaluate his rotator cuff.  Patient had acute injury almost 5 months ago with continued pain.  Symptoms are not improving with NSAIDs.  Follow up after the MRI is completed.            All previous pertinent notes including ER visits, physical therapy visits, other orthopedic visits as well as other care for the same musculoskeletal problem were reviewed.  All pertinent lab values and previous imaging was reviewed pertinent to the current visit.    This note was created using [employer] voice recognition software that occasionally misinterpreted phrases or words.    Consult note is delivered via Epic messaging service.           [1]   Social History  Socioeconomic History    Marital status:    Tobacco Use    Smoking status: Former     Current packs/day: 0.00     Types: Cigarettes     Quit date:      Years since quittin.4    Smokeless tobacco: Never   Substance and Sexual Activity    Alcohol use: Not Currently    Drug use: Never    Sexual activity: Yes     Partners: Female     Birth control/protection: None     Social Drivers of Health     Financial Resource Strain: Low Risk  (2024)    Overall Financial Resource Strain (CARDIA)     Difficulty of Paying Living Expenses: Not very hard   Food Insecurity: No Food Insecurity (2024)    Hunger Vital Sign     Worried About Running Out of Food in the Last Year: Never true     Ran Out of Food in the Last Year: Never true   Transportation Needs: No Transportation Needs (2024)    PRAPARE - Transportation     Lack of Transportation (Medical): No     Lack of Transportation (Non-Medical): No   Physical Activity: Insufficiently Active (2024)    Exercise Vital Sign     Days of Exercise per Week: 2 days     Minutes of Exercise per Session: 20 min    Stress: Stress Concern Present (4/2/2024)    [employer] [place] [employer] [facility] - Occupational Stress Questionnaire     Feeling of Stress : To some extent   Housing Stability: Low Risk  (4/2/2024)    Housing Stability Vital Sign     Unable to Pay for Housing in the Last Year: No     Number of Places Lived in the Last Year: 1     Unstable Housing in the Last Year: No

## 2025-05-30 ENCOUNTER — HOSPITAL ENCOUNTER (OUTPATIENT)
Dept: RADIOLOGY | Facility: HOSPITAL | Age: 60
Discharge: HOME | End: 2025-05-30
Attending: ORTHOPAEDIC SURGERY
Payer: OTHER GOVERNMENT

## 2025-05-30 DIAGNOSIS — M75.101 TEAR OF RIGHT ROTATOR CUFF, UNSPECIFIED TEAR EXTENT, UNSPECIFIED WHETHER TRAUMATIC: ICD-10-CM

## 2025-05-30 DIAGNOSIS — M25.511 RIGHT SHOULDER PAIN, UNSPECIFIED CHRONICITY: ICD-10-CM

## 2025-05-30 PROCEDURE — 73221 MRI JOINT UPR EXTREM W/O DYE: CPT | Mod: 26,RT,, | Performed by: RADIOLOGY

## 2025-05-30 PROCEDURE — 73221 MRI JOINT UPR EXTREM W/O DYE: CPT | Mod: TC,RT

## 2025-06-03 ENCOUNTER — RESULTS FOLLOW-UP (OUTPATIENT)
Dept: ORTHOPEDICS | Facility: HOSPITAL | Age: 60
End: 2025-06-03

## 2025-06-12 ENCOUNTER — OFFICE VISIT (OUTPATIENT)
Dept: ORTHOPEDICS | Facility: CLINIC | Age: 60
End: 2025-06-12
Payer: OTHER GOVERNMENT

## 2025-06-12 VITALS — RESPIRATION RATE: 16 BRPM

## 2025-06-12 DIAGNOSIS — M75.100 ROTATOR CUFF SYNDROME, UNSPECIFIED LATERALITY: ICD-10-CM

## 2025-06-12 DIAGNOSIS — M75.101 TEAR OF RIGHT ROTATOR CUFF, UNSPECIFIED TEAR EXTENT, UNSPECIFIED WHETHER TRAUMATIC: Primary | ICD-10-CM

## 2025-06-12 DIAGNOSIS — M75.100 ROTATOR CUFF SYNDROME, UNSPECIFIED LATERALITY: Primary | ICD-10-CM

## 2025-06-12 PROCEDURE — 99212 OFFICE O/P EST SF 10 MIN: CPT | Mod: PBBFAC,PO | Performed by: ORTHOPAEDIC SURGERY

## 2025-06-12 PROCEDURE — 99999 PR PBB SHADOW E&M-EST. PATIENT-LVL II: CPT | Mod: PBBFAC,,, | Performed by: ORTHOPAEDIC SURGERY

## 2025-06-12 PROCEDURE — 99214 OFFICE O/P EST MOD 30 MIN: CPT | Mod: S$PBB,,, | Performed by: ORTHOPAEDIC SURGERY

## 2025-06-12 NOTE — PROGRESS NOTES
Past Medical History:   Diagnosis Date    Essential hypertension 12/17/2020    Mixed hyperlipidemia 12/17/2020    Multiple joint pain 12/17/2020    Prediabetes 12/17/2020       Past Surgical History:   Procedure Laterality Date    ADENOIDECTOMY      APPENDECTOMY  1992    CIRCUMCISION      MANDIBLE SURGERY         Current Outpatient Medications   Medication Sig    ergocalciferol (ERGOCALCIFEROL) 50,000 unit Cap Take 1 capsule (50,000 Units total) by mouth every 7 days.    fenofibrate (TRICOR) 54 MG tablet TAKE 1 TABLET DAILY    folic acid (FOLVITE) 1 MG tablet Take 1 tablet (1 mg total) by mouth once daily.    meloxicam (MOBIC) 15 MG tablet Take 1 tablet (15 mg total) by mouth once daily.    methotrexate 2.5 MG Tab Take 6 tablets (15 mg total) by mouth every 7 days.    omega-3 fatty acids/fish oil (FISH OIL-OMEGA-3 FATTY ACIDS) 300-1,000 mg capsule Take by mouth once daily.    roflumilast (ZORYVE) 0.3 % Crea Apply 1 application  topically once daily. Use on ears, face and genitalia    rosuvastatin (CRESTOR) 20 MG tablet Take 1 tablet (20 mg total) by mouth once daily.    telmisartan-hydrochlorothiazide (MICARDIS HCT) 40-12.5 mg per tablet TAKE 1 TABLET DAILY     No current facility-administered medications for this visit.       Review of patient's allergies indicates:   Allergen Reactions    Bupropion Hives       Family History   Problem Relation Name Age of Onset    Arrhythmia Mother      Heart attack Mother      Ulcers Father      Prostate cancer Neg Hx      Colon cancer Neg Hx      Diabetes Neg Hx         Social History[1]    Chief Complaint:   Chief Complaint   Patient presents with    Right Shoulder - Pain       History of present illness:  59-year-old right-hand-dominant male seen for right shoulder injury that occurred back in January.  Patient was pulling some lines and fell causing a jerking to his right arm.  Pain is not improved since then.  Pain gets worse as the day goes on.  He has tried meloxicam.   Pain is a 3/10.  Hurts to reach up above his head or behind his back.  MRI did not show an acute injury.  Has a fair amount of tendinosis.    Prior treatment:  Mobic        Review of Systems:    Constitution: Negative for chills, fever, and sweats.  Negative for unexplained weight loss.    HENT:  Negative for headaches and blurry vision.    Cardiovascular:Negative for chest pain or irregular heart beat. Negative for hypertension.    Respiratory:  Negative for cough and shortness of breath.    Gastrointestinal: Negative for abdominal pain, heartburn, melena, nausea, and vomitting.    Genitourinary:  Negative bladder incontinence and dysuria.    Musculoskeletal:  See HPI    Neurological: Negative for numbness.    Psychiatric/Behavioral: Negative for depression.  The patient is not nervous/anxious.      Endocrine: Negative for polyuria    Hematologic/Lymphatic: Negative for bleeding problem.  Does not bruise/bleed easily.    Skin: Negative for poor would healing and rash      Physical Examination:    Vital Signs:    Vitals:    06/12/25 1347   Resp: 16       There is no height or weight on file to calculate BMI.    This a well-developed, well nourished patient in no acute distress.  They are alert and oriented and cooperative to examination.  Pt. walks without an antalgic gait.      Examination of the right shoulder shows no rashes or erythema. There are no masses, ecchymosis, or atrophy. The patient has full range of motion in forward flexion, external rotation, and internal rotation to the mid T-spine. The patient has positive Dumont and Neer test - Wake's test. - Speeds test. Nontender to palpation over a.c. joint. Normal stability anteriorly, posteriorly, and negative sulcus sign. Passive range of motion: Forward flexion of 180°, external rotation at 90° of 90°, internal rotation of 50°, and external rotation at 0° of 50°. 2+ radial pulse. Intact axillary, radial, median and ulnar sensation.  4+ out of 5  resisted forward flexion, external rotation, and negative lift off test.      X-rays:  X-rays of the right shoulder available for review which show some mild degenerative changes of the glenohumeral joint    MRI of the right shoulder is reviewed and interpreted:1. Partial-thickness articular sided tear at the far posterior insertion of infraspinatus, with a background tendinosis.  2. tendinosis at the insertion of supraspinatus.  3. Mild AC joint degenerative change.     Assessment:  Right rotator cuff tendinopathy with partial-thickness tear    Plan:  I reviewed the findings with him today.  I do not see anything traumatic in nature.  Nothing surgical as well.  Recommended dedicated formal physical therapy for rotator cuff program.  Follow up in 2 months.            All previous pertinent notes including ER visits, physical therapy visits, other orthopedic visits as well as other care for the same musculoskeletal problem were reviewed.  All pertinent lab values and previous imaging was reviewed pertinent to the current visit.    This note was created using [employer] voice recognition software that occasionally misinterpreted phrases or words.    Consult note is delivered via Epic messaging service.             [1]   Social History  Socioeconomic History    Marital status:    Tobacco Use    Smoking status: Former     Current packs/day: 0.00     Types: Cigarettes     Quit date:      Years since quittin.4    Smokeless tobacco: Never   Substance and Sexual Activity    Alcohol use: Not Currently    Drug use: Never    Sexual activity: Yes     Partners: Female     Birth control/protection: None     Social Drivers of Health     Financial Resource Strain: Low Risk  (2024)    Overall Financial Resource Strain (CARDIA)     Difficulty of Paying Living Expenses: Not very hard   Food Insecurity: No Food Insecurity (2024)    Hunger Vital Sign     Worried About Running Out of Food in the Last Year: Never true      Ran Out of Food in the Last Year: Never true   Transportation Needs: No Transportation Needs (4/2/2024)    PRAPARE - Transportation     Lack of Transportation (Medical): No     Lack of Transportation (Non-Medical): No   Physical Activity: Insufficiently Active (4/2/2024)    Exercise Vital Sign     Days of Exercise per Week: 2 days     Minutes of Exercise per Session: 20 min   Stress: Stress Concern Present (4/2/2024)    [employer] [place] [employer] - Occupational Stress Questionnaire     Feeling of Stress : To some extent   Housing Stability: Low Risk  (4/2/2024)    Housing Stability Vital Sign     Unable to Pay for Housing in the Last Year: No     Number of Places Lived in the Last Year: 1     Unstable Housing in the Last Year: No

## 2025-06-13 ENCOUNTER — CLINICAL SUPPORT (OUTPATIENT)
Dept: REHABILITATION | Facility: HOSPITAL | Age: 60
End: 2025-06-13
Payer: OTHER GOVERNMENT

## 2025-06-13 DIAGNOSIS — R68.89 ACTIVITY INTOLERANCE: Primary | ICD-10-CM

## 2025-06-13 DIAGNOSIS — M25.511 RIGHT SHOULDER PAIN, UNSPECIFIED CHRONICITY: ICD-10-CM

## 2025-06-13 DIAGNOSIS — M75.100 ROTATOR CUFF SYNDROME, UNSPECIFIED LATERALITY: ICD-10-CM

## 2025-06-13 DIAGNOSIS — M75.101 TEAR OF RIGHT ROTATOR CUFF, UNSPECIFIED TEAR EXTENT, UNSPECIFIED WHETHER TRAUMATIC: ICD-10-CM

## 2025-06-13 PROCEDURE — 97140 MANUAL THERAPY 1/> REGIONS: CPT | Mod: PN

## 2025-06-13 PROCEDURE — 97162 PT EVAL MOD COMPLEX 30 MIN: CPT | Mod: PN

## 2025-06-15 PROBLEM — R68.89 ACTIVITY INTOLERANCE: Status: ACTIVE | Noted: 2025-06-15

## 2025-06-16 NOTE — PROGRESS NOTES
"  Outpatient Rehab    Physical Therapy Evaluation    Patient Name: Wally Decker  MRN: [id number]  YOB: 1965  Encounter Date: 6/13/2025    Therapy Diagnosis:   Encounter Diagnoses   Name Primary?    Activity intolerance Yes    Right shoulder pain, unspecified chronicity     Tear of right rotator cuff, unspecified tear extent, unspecified whether traumatic     Rotator cuff syndrome, unspecified laterality      Physician: Wilfredo Moraes,*    Physician Orders: Eval and Treat  Medical Diagnosis: Tear of right rotator cuff, unspecified tear extent, unspecified whether traumatic  Rotator cuff syndrome, unspecified laterality  Surgical Diagnosis: Not applicable for this Episode   Surgical Date: Not applicable for this Episode  Days Since Last Surgery: Not applicable for this Episode    Visit # / Visits Authorized:  1 / 1  Insurance Authorization Period: 6/7/2025 to 10/5/2025  Date of Evaluation: 6/13/2025  Plan of Care Certification: 6/13/2025 to 8/1/2025     Time In: 1328   Time Out: 1430  Total Time (in minutes): 62   Total Billable Time (in minutes): 53    Intake Outcome Measure for FOTO Survey    Therapist reviewed FOTO scores for Wally Decker on 6/13/2025.   FOTO report - see Media section or FOTO account episode details.     Intake Score: 57%    Precautions:       Standard; drug induced immunodeficiency    Subjective   History of Present Illness  Wally is a 59 y.o. male who reports to physical therapy with a chief concern of R shoulder pain.     The patient reports a medical diagnosis of Tear of right rotator cuff, unspecified tear extent, unspecified whether traumatic, Rotator cuff syndrome, unspecified laterality. The patient has experienced this issue since 06/12/25.   Diagnostic tests related to this condition: MRI studies and X-ray.   MRI Studies Details: R shoulder 5/30/2025 Impression per report: "1. Partial-thickness articular sided tear at the far posterior insertion of " "infraspinatus, with a background tendinosis.  2. Additional tendinosis at the insertion of supraspinatus.  3. Mild AC joint degenerative change."  X-Ray Details: R shoulder 5/14/2025:  Impression per report: "Mild chronic degenerative osteoarthrosis."    Dominant Hand: Right  History of Present Condition/Illness: Patient was in his usual state of health until several months ago (Jan 2025) when he injured his R shoulder while pulling donato from a tree.  He states it gave way and yanked him "off my feet". Had immediate onset pain but it went away after awhile.  A couple days later he noticed R shoulder pain that has been gradually worsening since. He went for his 6 month check up and was referred to ortho. Has undergone both X-ray and MRI. See results above.  He is now being referred to OP PT.     Activities of Daily Living  Social history was obtained from Patient.    General Prior Level of Function Comments: No restriction  General Current Level of Function Comments: Difficulty with upper body dressing (tight clothing), difficulty scratching his back with his R hand. Increased pain with using computer mouse so has tried to shift to L hand. Pain worsens as the day progresses. Avoids lifting/carrying with R UE. Increased shoulder pain with driving (tends to rest R elbow on center console). Has to wiggle a lot to get comfortable to sleep. Sleep is interrupted 3-4x/night.  Usually awakens with it hurting. Overhead reaching intermittently impaired.  Patient Responsibilities: Yard work, Personal ADL, Laundry, Meal prep, Shopping, Health management, Home management, Community mobility, Driving, Financial management      Pain     Patient reports a current pain level of 3/10. Pain at best is reported as 0/10. Pain at worst is reported as 5/10.   Location: R lateral shoulder. When it's at its worst the pain extends up the R shoulder extending to R posterior neck.  Clinical Progression (since onset): Worsening  Pain Qualities: " Other (Comment), Aching, Burning, Dull, Needle-like  Other Pain Qualities: Intermittent  Pain-Relieving Factors: Rest, Sleeping, Activity modification, Change in position  Pain-Aggravating Factors: Other (Comment), Driving, Computer work, Lifting  Other Pain-Aggravating Factors: Using computer mouse, repetitive lifting         Review of Systems  Patient reports: Sleep Disturbance and Immunosuppression History  Patient denies: Chest Pain, Dizziness, Fainting, Fever, Headache, Motion Sickness, Nausea, Night Pain, Shortness of Breath, Cancer History, Cardiac History, Diabetes, Gallbladder History, Kidney History, Osteoarthritis, Recent Infection History, Rheumatoid Arthritis, Stomach History, Trauma History, and Ulcer History  Additional Red Flag Details: Patient has been diagnosed with psoriatic arthritis and is taking medication to control that.  He states he has gotten sick 2x since starting the medication.     Treatment History  Treatments  Previously Received Treatments: Yes  Previous Treatments: Medications - prescription  Currently Receiving Treatments: No    Living Arrangements  Living Situation  Housing: Home independently  Living Arrangements: Spouse/significant other    2 story home with 1 flight of steps. Does use the hand rail but does not have any problems with the shoulder related to that.       Employment  Employment Status: Employed full-time   Mostly desk work (administrative work)     Past Medical History/Physical Systems Review:   Erickson Decker  has a past medical history of Essential hypertension, Mixed hyperlipidemia, Multiple joint pain, and Prediabetes.    Erickson Decker  has a past surgical history that includes Appendectomy (1992); Mandible surgery; Circumcision; and Adenoidectomy.    Erickson has a current medication list which includes the following prescription(s): ergocalciferol, fenofibrate, folic acid, meloxicam, methotrexate, fish oil-omega-3 fatty acids, zoryve, rosuvastatin, and  micardis hct.    Review of patient's allergies indicates:   Allergen Reactions    Bupropion Hives      Objective   Bracing     No bracing noted.         Posture                 Standing: pelvis and shoulders level, adequate lumbar lordosis.  Sitting: minimally reversed lumbar lordosis, increased thoracic kyphosis, moderate rounded shoulder and forward head posture.     Upper Extremity Sensation  Right Upper Extremity Sensation  Intact: Light Touch       Left Upper Extremity Sensation  Intact: Light Touch       Reports intermittent numbness/burning R lateral shoulder.          Right Upper Extremity Reflexes       Biceps, C5-C6: Normal (2+)    Brachioradialis, C6: Normal (2+)    Triceps, C7: Normal (2+)         Left Upper Extremity Reflexes       Biceps, C5-C6: Normal (2+)    Brachioradialis, C6: Normal (2+)    Triceps, C7: Normal (2+)             Elbow Palpation  Right Elbow Palpation  Abnormal: Muscle     Tenderness to deep palpation noted R supraspinatus throughout supraspinous fossa. Increased muscle tension throughout upper trapezius, levator scapula, pectoralis major, cervical paraspinals, infraspinatus, and supraspinatus    Left Elbow Palpation  Abnormal: Muscle     Increased muscle tension throughout upper trapezius, levator scapula, pectoralis major, cervical paraspinals, infraspinatus, and supraspinatus        Subcranial Range of Motion   Active Restricted? Passive Restricted? Pain   Flexion         Protraction         Retraction           Cervical Range of Motion   Active (deg) Passive (deg) Pain   Flexion 50   Yes (Increased R shoulder discomfort)   Extension 40       Right Lateral Flexion 30       Right Rotation 70       Left Lateral Flexion 30       Left Rotation 80              Shoulder Range of Motion  Right Shoulder   Active (deg) Passive (deg) Pain   Flexion 165       Extension 50       Scaption         ABduction 170       ADduction         Horizontal ABduction 90       Horizontal ADduction 30        External Rotation (Shoulder ABducted 0 degrees) 55       External Rotation (Shoulder ABducted 45 degrees)         External Rotation (Shoulder ABducted 90 degrees) 90       Internal Rotation (Shoulder ABducted 0 degrees) 40       Internal Rotation (Shoulder ABducted 45 degrees)         Internal Rotation (Shoulder ABducted 90 degrees) 60         Left Shoulder   Active (deg) Passive (deg) Pain   Flexion 170       Extension 50       Scaption         ABduction 170       ADduction         Horizontal ABduction 90       Horizontal ADduction 30       External Rotation (Shoulder ABducted 0 degrees) 60       External Rotation (Shoulder ABducted 45 degrees)         External Rotation (Shoulder ABducted 90 degrees) 90       Internal Rotation (Shoulder ABducted 0 degrees) 55       Internal Rotation (Shoulder ABducted 45 degrees)         Internal Rotation (Shoulder ABducted 90 degrees) 65                     Shoulder Strength - Planes of Motion   Right Strength Right Pain Left Strength Left  Pain   Flexion 4   5     Extension 5   5     ABduction 4+   5     ADduction 5   5     Horizontal ABduction 4+   5     Horizontal ADduction 4+   5     Internal Rotation 0° 4   5     Internal Rotation 90° 4   5     External Rotation 0° 4+   5     External Rotation 90° 4   5         Elbow Strength   Right Strength Right Pain Left Strength Left  Pain   Flexion (C6) 5   5     Extension (C7) 5   5          Forearm Strength   Right Strength Right Pain Left Strength Left  Pain   Pronation 5   5     Supination 5   5         Right  Strength  Right Hand Dynamometer Position: 2  Elbow Position Forearm Position Trial 1 (lbs) Trial 2  (lbs) Trial 3  (lbs) Average  (lbs) Pain   Flexed Neutral 85 80 100 88.33         Left  Strength  Left Hand Dynamometer Position: 2  Elbow Position Forearm Position Trial 1 (lbs) Trial 2 (lbs) Trial 3 (lbs) Average (lbs) Pain   Flexed Neutral 95 103 105 101                Shoulder Special Tests  Rotator Cuff  Tests  Positive: Right Empty Can  Negative: Right Bear Hug, Left Bear Hug, Right Drop Arm, Left Drop Arm, and Left Empty Can  Negative: Right Full Can, Left Full Can, Right Lift Off, and Left Lift Off  Impingement Tests  Positive: Left Dumont-Indra  Negative: Right Dumont-Indra       Moderate tightness present in B upper trapezii, B levator scapulae, B rhomboids, B pectoralis muscles.         Shoulder Joint Mobility  Right Shoulder Mobility  Normal: Sternoclavicular, Anterior Capsule Mobility, Long Axis Distraction Mobility, and Acromioclavicular  Hypomobile: Posterior Capsule Mobility and Inferior Capsule Mobility  Left Shoulder Mobility  Normal: Sternoclavicular, Anterior Capsule Mobility, Posterior Capsule Mobility, Inferior Capsule Mobility, Long Axis Distraction Mobility, and Acromioclavicular       Right Scapular Mobility  Normal: Inferior and Lateral  Hypomobile: Superior and Medial  Left Scapular Mobility  Normal: Superior, Medial, Inferior, and Lateral  Patient guarding noted with PT guided scapular mobility thus results may be skewed.  Crepitus noted with scapular mobility              Treatment:  Manual Therapy  MT 1: Scapular scouring to R while in L side lying  MT 2: Trigger point release to R infraspinatus, R supraspinatus in L side lying    Time Entry(in minutes):  PT Evaluation (Moderate) Time Entry: 43  Manual Therapy Time Entry: 10    Assessment & Plan   Assessment  Wally presents with a condition of Moderate complexity.   Presentation of Symptoms: Changing  Will Comorbidities Impact Care: Yes  Psoriatic arthritis is being treated with immunosuppression medication which will prevent use of dry needling     Functional Limitations: Activity tolerance, Bed mobility, Carrying objects, Completing self-care activities, Completing work/school activities, Driving, Disrupted sleep pattern, Pain when reaching, Pain with ADLs/IADLs, Participating in leisure activities, Performing household chores,  Reaching  Impairments: Abnormal muscle tone, Activity intolerance, Impaired physical strength, Pain with functional activity  Personal Factors Affecting Prognosis: Pain    Patient Goal for Therapy (PT): To avoid surgery  Prognosis: Fair  Prognosis Details: Anticipated barriers to treatment: none  Assessment Details:  Wally is a 59 y.o. male referred to outpatient Physical Therapy with a medical diagnosis of Tear of right rotator cuff, unspecified tear extent, unspecified whether traumatic, Rotator cuff syndrome, unspecified laterality. Patient presents with therapeutic diagnoses of activity intolerance, R shoulder pain.  Additional problems include: impaired posture, decreased R UE strength, trigger point tenderness, decreased upper quadrant flexibility.  These problems contribute to the following limitations: difficulty reaching, difficulty with upper body dressing (tight garments), impaired driving, sleep disturbance, altered work tolerance, limited ability to participate in yard work and home care activities.  Wally will benefit from skilled PT services to address these problems in order to minimize pain, to reduce trigger points, to improve flexibility/strength, to minimize functional deficits and to maximize activity tolerance.      Plan  From a physical therapy perspective, the patient would benefit from: Skilled Rehab Services    Planned therapy interventions include: Therapeutic exercise, Therapeutic activities, Neuromuscular re-education, and Other (Comment). Therapeutic taping  Planned modalities to include: Cryotherapy (cold pack), Electrical stimulation - passive/unattended, and Thermotherapy (hot pack).        Visit Frequency: 2 times Per Week for 6 Weeks.       This plan was discussed with Patient.   Discussion participants: Agreed Upon Plan of Care  Plan details: Wally may be treated by PTA as part of their rehab team.       The patient's spiritual, cultural, and educational needs were considered, and  the patient is agreeable to the plan of care and goals.     Education  Education was done with Patient. The patient's learning style includes Listening. The patient Verbalizes understanding.         Educated patient in role of PT and proposed POC     Goals:   Active       Long Term Goals        Facilitate improved upper quadrant flexibility        Start:  06/13/25    Expected End:  08/01/25            Patient will sleep > 6 hours without interruption by R shoulder pain at least 5 of 7 nights per week       Start:  06/13/25    Expected End:  08/01/25            Patient will perform his usual work tasks without increased R shoulder pain       Start:  06/13/25    Expected End:  08/01/25            Patient will consistently perform self care and dressing activities without increased shoulder pain       Start:  06/13/25    Expected End:  08/01/25            Patient will reach to 2nd shelf of overhead cabinet using R UE to put away/retrieve dishes without increased shoulder pain       Start:  06/13/25    Expected End:  08/01/25            Patient will resume yard work activities with no more than minimal increase in R shoulder pain       Start:  06/13/25    Expected End:  08/01/25            Improve functional outcomes score to > 70% as indicated by FOTO shoulder survey       Start:  06/13/25    Expected End:  08/01/25            Patient will be independent in HEP.        Start:  06/13/25    Expected End:  08/01/25               Short Term Goals       Decrease R scapulothoracic crepitus to minimal        Start:  06/13/25    Expected End:  07/08/25            Facilitate improved posture       Start:  06/13/25    Expected End:  07/08/25            Facilitate improved R GH joint play       Start:  06/13/25    Expected End:  07/08/25            Patient will utilize computer mouse with his R hand > 1 hour without increased shoulder pain       Start:  06/13/25    Expected End:  07/08/25            Patient will utilize R UE to  assist with driving tasks without increased shoulder pain > 50% of the time       Start:  06/13/25    Expected End:  07/08/25                Gris Culver, PT

## 2025-06-18 ENCOUNTER — CLINICAL SUPPORT (OUTPATIENT)
Dept: REHABILITATION | Facility: HOSPITAL | Age: 60
End: 2025-06-18
Payer: OTHER GOVERNMENT

## 2025-06-18 DIAGNOSIS — M75.101 TEAR OF RIGHT ROTATOR CUFF, UNSPECIFIED TEAR EXTENT, UNSPECIFIED WHETHER TRAUMATIC: ICD-10-CM

## 2025-06-18 DIAGNOSIS — R68.89 ACTIVITY INTOLERANCE: Primary | ICD-10-CM

## 2025-06-18 DIAGNOSIS — M25.511 RIGHT SHOULDER PAIN, UNSPECIFIED CHRONICITY: ICD-10-CM

## 2025-06-18 PROCEDURE — 97110 THERAPEUTIC EXERCISES: CPT | Mod: PN,CQ

## 2025-06-18 PROCEDURE — 97140 MANUAL THERAPY 1/> REGIONS: CPT | Mod: PN,CQ

## 2025-06-18 NOTE — PROGRESS NOTES
"  Outpatient Rehab    Physical Therapy Visit    Patient Name: Wally Decker  MRN: [id number]  YOB: 1965  Encounter Date: 6/18/2025    Therapy Diagnosis:   Encounter Diagnoses   Name Primary?    Activity intolerance Yes    Right shoulder pain, unspecified chronicity     Tear of right rotator cuff, unspecified tear extent, unspecified whether traumatic      Physician: Wilfredo Moraes,*    Physician Orders: Eval and Treat  Medical Diagnosis: Tear of right rotator cuff, unspecified tear extent, unspecified whether traumatic  Rotator cuff syndrome, unspecified laterality  Surgical Diagnosis: Not applicable for this Episode   Surgical Date: Not applicable for this Episode  Days Since Last Surgery: Not applicable for this Episode    Visit # / Visits Authorized: 1 / 15 (2 total)  Insurance Authorization Period: 6/7/2025 to 10/5/2025  Date of Evaluation: 6/13/2025  Plan of Care Certification: 6/13/2025 to 8/1/2025      PT/PTA: PTA   Number of PTA visits since last PT visit: 1  Time In: 1531   Time Out: 1625  Total Time (in minutes): 54   Total Billable Time (in minutes): 53    FOTO:  Intake Score: 57%  Survey Score 2:  %  Survey Score 3:  %    Precautions:       Subjective   "It hurts." "Thank you. It feels much better; I woke up with it aggravated this morning.".  Pain reported as 3/10. R shoulder over lateral bicipital groove    Objective            Treatment:  Therapeutic Exercise  TE 1: UBE lvl1.5mph x6' (reversing midway)  TE 2: Seated: Upper trapezius stretches 3x30s ea, Levator scapulae stretches 3x30s ea, Middle trapezius/Rhomboid stretches 3x30s, SCM/Scalene stretches 3x30s ea  TE 3: Standing: Doorway pectoral stretches 3x30s; Green theratubing stability training x20 ea: Rows w/scapular retractions, B shoulder extension w/scapular retractions, R adduction, R internal rotation @side, R external rotation @side, Lat pulls  TE 4: Prone x20 ea: R shoulder flexion, R scaption, R horizontal " abduction, Ws  TE 5: Sidelying: R shoulder ER @side against gravity using 2# x20  Manual Therapy  MT 1: Strumming & Trigger-point release to R lateral biceps/brachioradialis, UT, distal deep neck flexors, supraspinatus, medial side of infraspinatus  MT 2: R GH jt distraction w/grade I-II anterior-posterior & superior-inferior mobilizations  MT 3: Scapular scouring to R while in L side lying      Time Entry(in minutes):  Manual Therapy Time Entry: 15  Therapeutic Exercise Time Entry: 38    Assessment & Plan   Assessment: The patient reported to physical therapy stating moderate shoulder pain. Wally Decker was instructed in initial treatment program targeting functional stability, strength, and mobility training. Patient demonstrated excess tension in right upper trapezius, distal deep neck flexors, and lateral biceps/brachioradialis. Patient reports decreased symptoms following treatment.  Evaluation/Treatment Tolerance: Patient tolerated treatment well    The patient will continue to benefit from skilled outpatient physical therapy in order to address the deficits listed in the problem list on the initial evaluation, provide patient and family education, and maximize the patients level of independence in the home and community environments.     The patient's spiritual, cultural, and educational needs were considered, and the patient is agreeable to the plan of care and goals.     Education  Education was done with Patient. The patient's learning style includes Demonstration, Listening, and Tactile. The patient Demonstrates understanding and Verbalizes understanding.         The patient was instructed in initial treatment program. Education was provided on the rehabilitation process and reasoning behind. Advised patient to not perform heavy lifting, especially overhead, and to be mindful of posture to prevent further injury.       Plan: The patient is to be progressed within the established plan of care as tolerated  in order to accomplish stated goals. Plan to incorporate upper extremity ranger (flexion, arc, & abduction); incorporating resistance to as tolerated. Provide formal home exercise program subsequently.    Goals:   Active       Long Term Goals        Facilitate improved upper quadrant flexibility  (Progressing)       Start:  06/13/25    Expected End:  08/01/25            Patient will sleep > 6 hours without interruption by R shoulder pain at least 5 of 7 nights per week (Ongoing)       Start:  06/13/25    Expected End:  08/01/25            Patient will perform his usual work tasks without increased R shoulder pain (Ongoing)       Start:  06/13/25    Expected End:  08/01/25            Patient will consistently perform self care and dressing activities without increased shoulder pain (Progressing)       Start:  06/13/25    Expected End:  08/01/25            Patient will reach to 2nd shelf of overhead cabinet using R UE to put away/retrieve dishes without increased shoulder pain (Progressing)       Start:  06/13/25    Expected End:  08/01/25            Patient will resume yard work activities with no more than minimal increase in R shoulder pain (Ongoing)       Start:  06/13/25    Expected End:  08/01/25            Improve functional outcomes score to > 70% as indicated by FOTO shoulder survey (Ongoing)       Start:  06/13/25    Expected End:  08/01/25            Patient will be independent in HEP.  (Ongoing)       Start:  06/13/25    Expected End:  08/01/25               Short Term Goals       Decrease R scapulothoracic crepitus to minimal  (Progressing)       Start:  06/13/25    Expected End:  07/08/25            Facilitate improved posture (Progressing)       Start:  06/13/25    Expected End:  07/08/25            Facilitate improved R GH joint play (Progressing)       Start:  06/13/25    Expected End:  07/08/25            Patient will utilize computer mouse with his R hand > 1 hour without increased shoulder pain  (Progressing)       Start:  06/13/25    Expected End:  07/08/25            Patient will utilize R UE to assist with driving tasks without increased shoulder pain > 50% of the time (Progressing)       Start:  06/13/25    Expected End:  07/08/25                Krissy Bhatia, PTA

## 2025-06-20 ENCOUNTER — CLINICAL SUPPORT (OUTPATIENT)
Dept: REHABILITATION | Facility: HOSPITAL | Age: 60
End: 2025-06-20
Attending: ORTHOPAEDIC SURGERY
Payer: OTHER GOVERNMENT

## 2025-06-20 DIAGNOSIS — M75.101 TEAR OF RIGHT ROTATOR CUFF, UNSPECIFIED TEAR EXTENT, UNSPECIFIED WHETHER TRAUMATIC: ICD-10-CM

## 2025-06-20 DIAGNOSIS — M25.511 RIGHT SHOULDER PAIN, UNSPECIFIED CHRONICITY: ICD-10-CM

## 2025-06-20 DIAGNOSIS — R68.89 ACTIVITY INTOLERANCE: Primary | ICD-10-CM

## 2025-06-20 PROCEDURE — 97110 THERAPEUTIC EXERCISES: CPT | Mod: PN,CQ

## 2025-06-20 NOTE — PROGRESS NOTES
"  Outpatient Rehab    Physical Therapy Visit    Patient Name: Wally Decker  MRN: [id number]  YOB: 1965  Encounter Date: 6/20/2025    Therapy Diagnosis:   Encounter Diagnoses   Name Primary?    Activity intolerance Yes    Right shoulder pain, unspecified chronicity     Tear of right rotator cuff, unspecified tear extent, unspecified whether traumatic      Physician: Wilfredo Moraes,*    Physician Orders: Eval and Treat  Medical Diagnosis: Tear of right rotator cuff, unspecified tear extent, unspecified whether traumatic  Rotator cuff syndrome, unspecified laterality  Surgical Diagnosis: Not applicable for this Episode   Surgical Date: Not applicable for this Episode  Days Since Last Surgery: Not applicable for this Episode    Visit # / Visits Authorized: 2 / 15 (3 total)  Insurance Authorization Period: 6/7/2025 to 10/5/2025  Date of Evaluation: 6/13/2025  Plan of Care Certification: 6/13/2025 to 8/1/2025      PT/PTA: PTA   Number of PTA visits since last PT visit: 2  Time In: 0906   Time Out: 0957  Total Time (in minutes): 51   Total Billable Time (in minutes): 25    FOTO:  Intake Score: 57%  Survey Score 2:  %  Survey Score 3:  %    Precautions:       Subjective   "It's about the same." Post previous: "It was good.".  Pain reported as 2/10. 2-3/10 R shoulder over lateral bicipital groove    Objective            Treatment:  Therapeutic Exercise  TE 1: UBE thi6fpp x8' (reversing midway)  TE 2: Seated: Upper trapezius stretches 3x30s ea, Levator scapulae stretches 3x30s ea, Middle trapezius/Rhomboid stretches 3x30s, SCM/Scalene stretches 3x30s ea  TE 3: Standing: Doorway pectoral stretches 3x30s; UE ranger for archs x20; Green theratubing stability training x20 ea: Rows w/scapular retractions, B shoulder extension w/scapular retractions, B shoulder ER @side w/scapular retractions, B shoulder horizontal abduction w/scapular retractions, R adduction, R internal rotation @side, Lat pulls  TE 4: " Sidelying: R shoulder ER @side against gravity using 2# x20  TE 5: Prone x20 ea: R shoulder flexion, R scaption, R horizontal abduction using 2#, Ws  Manual Therapy  MT 1: Strumming & Trigger-point release to R lateral biceps/brachioradialis, UT, distal deep neck flexors, supraspinatus, medial side of infraspinatus  MT 2: R GH jt distraction w/grade I-II anterior-posterior & superior-inferior mobilizations  MT 3: Scapular scouring to R while in L side lying      Time Entry(in minutes):  Manual Therapy Time Entry: 8  Therapeutic Exercise Time Entry: 43    Assessment & Plan   Assessment: The patient reported to physical therapy stating continued discomfort in affected shoulder. Wally Jeronimo was progressed with gradual increased strength and stability training. Excess tension reducing.  Evaluation/Treatment Tolerance: Patient tolerated treatment well    The patient will continue to benefit from skilled outpatient physical therapy in order to address the deficits listed in the problem list on the initial evaluation, provide patient and family education, and maximize the patients level of independence in the home and community environments.     The patient's spiritual, cultural, and educational needs were considered, and the patient is agreeable to the plan of care and goals.     Education  Education was done with Patient. The patient's learning style includes Demonstration and Listening. The patient Demonstrates understanding and Verbalizes understanding.         The patient was instructed in slight progressions with cues for technique.       Plan: The patient is to be progressed within the established plan of care as tolerated in order to accomplish stated goals. Plan to incorporate upper extremity ranger (flexion & abduction), incorporating resistance to as tolerated. Update home exercise program as further progressed.    Goals:   Active       Long Term Goals        Facilitate improved upper quadrant flexibility   (Progressing)       Start:  06/13/25    Expected End:  08/01/25            Patient will sleep > 6 hours without interruption by R shoulder pain at least 5 of 7 nights per week (Ongoing)       Start:  06/13/25    Expected End:  08/01/25            Patient will perform his usual work tasks without increased R shoulder pain (Ongoing)       Start:  06/13/25    Expected End:  08/01/25            Patient will consistently perform self care and dressing activities without increased shoulder pain (Progressing)       Start:  06/13/25    Expected End:  08/01/25            Patient will reach to 2nd shelf of overhead cabinet using R UE to put away/retrieve dishes without increased shoulder pain (Progressing)       Start:  06/13/25    Expected End:  08/01/25            Patient will resume yard work activities with no more than minimal increase in R shoulder pain (Ongoing)       Start:  06/13/25    Expected End:  08/01/25            Improve functional outcomes score to > 70% as indicated by FOTO shoulder survey (Ongoing)       Start:  06/13/25    Expected End:  08/01/25            Patient will be independent in HEP.  (Progressing)       Start:  06/13/25    Expected End:  08/01/25               Short Term Goals       Decrease R scapulothoracic crepitus to minimal  (Progressing)       Start:  06/13/25    Expected End:  07/08/25            Facilitate improved posture (Progressing)       Start:  06/13/25    Expected End:  07/08/25            Facilitate improved R GH joint play (Progressing)       Start:  06/13/25    Expected End:  07/08/25            Patient will utilize computer mouse with his R hand > 1 hour without increased shoulder pain (Progressing)       Start:  06/13/25    Expected End:  07/08/25            Patient will utilize R UE to assist with driving tasks without increased shoulder pain > 50% of the time (Progressing)       Start:  06/13/25    Expected End:  07/08/25                Krissy Bhatia, PTA

## 2025-06-25 ENCOUNTER — CLINICAL SUPPORT (OUTPATIENT)
Dept: REHABILITATION | Facility: HOSPITAL | Age: 60
End: 2025-06-25
Payer: OTHER GOVERNMENT

## 2025-06-25 DIAGNOSIS — M25.511 RIGHT SHOULDER PAIN, UNSPECIFIED CHRONICITY: ICD-10-CM

## 2025-06-25 DIAGNOSIS — R68.89 ACTIVITY INTOLERANCE: Primary | ICD-10-CM

## 2025-06-25 PROCEDURE — 97110 THERAPEUTIC EXERCISES: CPT | Mod: PN

## 2025-06-25 NOTE — PROGRESS NOTES
"  Outpatient Rehab    Physical Therapy Visit    Patient Name: Wally Decker  MRN: [id number]  YOB: 1965  Encounter Date: 6/25/2025    Therapy Diagnosis:   Encounter Diagnoses   Name Primary?    Activity intolerance Yes    Right shoulder pain, unspecified chronicity      Physician: Wilfredo Moraes,*    Physician Orders: Eval and Treat  Medical Diagnosis: Tear of right rotator cuff, unspecified tear extent, unspecified whether traumatic  Rotator cuff syndrome, unspecified laterality  Surgical Diagnosis: Not applicable for this Episode   Surgical Date: Not applicable for this Episode  Days Since Last Surgery: Not applicable for this Episode    Visit # / Visits Authorized:  3 / 15  Insurance Authorization Period: 6/7/2025 to 10/5/2025  Date of Evaluation: 6/13/2025  Plan of Care Certification: 6/13/2025 to 8/1/2025      PT/PTA: PT   Number of PTA visits since last PT visit:   Time In: 1535   Time Out: 1630  Total Time (in minutes): 55   Total Billable Time (in minutes): 50    FOTO:  Intake Score: 57%  Survey Score 2:  %  Survey Score 3:  %    Precautions:     Standard      Subjective   "I kind of messed it up this weekend doing yardwork.".  Pain reported as 1/10. Worst in past few days "6-7/10 in my neck, happened yesterday. "Slept on it wrong"    Objective          Treatment:  Therapeutic Exercise  TE 1: UBE [id number] x8' (reversing midway)  TE 2: Seated: Upper trapezius stretches 3x30s ea, Levator scapulae stretches 3x30s ea, Middle trapezius/Rhomboid stretches 3x30s, SCM/Scalene stretches 3x30s ea  TE 3: Standing: Doorway pectoral stretches 3x30s; UE ranger for flexion with end range stretch and for archs x20; Blue theratubing stability training x20 ea: Rows w/scapular retractions, B shoulder extension w/scapular retractions, B shoulder ER @side w/scapular retractions, B shoulder horizontal abduction w/scapular retractions, R adduction, R internal rotation @side, Lat pulls.  Added shoulder Ws " using green theratube x 20  TE 4: Sidelying: R shoulder ER @side against gravity using 2# x20  TE 5: Prone x20 ea: R shoulder flexion, R scaption, R horizontal abduction using 2#, Ws  TE 6: R sidelying sleeper stretch 10 x 3s  Manual Therapy  MT 1: PROM/end range stretching to R shoulder in supine  MT 2: Grade II lateral distraction to R GH joint. Grade II/III oscillations (shoulder abd/add) in ~ 45* abduction  MT 3: STM to R pectoralis muscle and R subscapularis    Time Entry(in minutes):  Manual Therapy Time Entry: 8  Therapeutic Exercise Time Entry: 42    Assessment & Plan   Assessment: Wally presented to PT today reporting having inflamed his shoulder over the weekend due to yard work then he experienced increased neck stiffness upon rising from bed yesterday.  However, he stated that the shoulder pain was minimal upon arrival. He participated in full program without significant increase in symptoms.  He reported stiffness with sidelying sleeper stretch.  Upon completion of therapy he reported decreased symptoms.   Evaluation/Treatment Tolerance: Patient tolerated treatment well    The patient will continue to benefit from skilled outpatient physical therapy in order to address the deficits listed in the problem list on the initial evaluation, provide patient and family education, and maximize the patients level of independence in the home and community environments.     The patient's spiritual, cultural, and educational needs were considered, and the patient is agreeable to the plan of care and goals.     Education  Education was done with Patient. The patient's learning style includes Demonstration and Listening. The patient Verbalizes understanding and Demonstrates understanding.         Educated patient in R side lying sleeper stretch. Also demonstrated same stretch in standing      Plan: The patient is to be progressed within the established plan of care as tolerated in order to accomplish stated goals.  Plan to add rhythmic stabilization activities using BOING/body blade as patient tolerates.  Will also add serratus punch/press ups.    Goals:   Active       Long Term Goals        Facilitate improved upper quadrant flexibility  (Progressing)       Start:  06/13/25    Expected End:  08/01/25            Patient will sleep > 6 hours without interruption by R shoulder pain at least 5 of 7 nights per week (Progressing)       Start:  06/13/25    Expected End:  08/01/25            Patient will perform his usual work tasks without increased R shoulder pain (Progressing)       Start:  06/13/25    Expected End:  08/01/25            Patient will consistently perform self care and dressing activities without increased shoulder pain (Progressing)       Start:  06/13/25    Expected End:  08/01/25            Patient will reach to 2nd shelf of overhead cabinet using R UE to put away/retrieve dishes without increased shoulder pain (Progressing)       Start:  06/13/25    Expected End:  08/01/25            Patient will resume yard work activities with no more than minimal increase in R shoulder pain (Progressing)       Start:  06/13/25    Expected End:  08/01/25            Improve functional outcomes score to > 70% as indicated by FOTO shoulder survey (Ongoing)       Start:  06/13/25    Expected End:  08/01/25            Patient will be independent in HEP.  (Progressing)       Start:  06/13/25    Expected End:  08/01/25               Short Term Goals       Decrease R scapulothoracic crepitus to minimal  (Ongoing)       Start:  06/13/25    Expected End:  07/08/25            Facilitate improved posture (Progressing)       Start:  06/13/25    Expected End:  07/08/25            Facilitate improved R GH joint play (Progressing)       Start:  06/13/25    Expected End:  07/08/25            Patient will utilize computer mouse with his R hand > 1 hour without increased shoulder pain (Progressing)       Start:  06/13/25    Expected End:   07/08/25            Patient will utilize R UE to assist with driving tasks without increased shoulder pain > 50% of the time (Progressing)       Start:  06/13/25    Expected End:  07/08/25                Gris Culver PT

## 2025-06-27 ENCOUNTER — CLINICAL SUPPORT (OUTPATIENT)
Dept: REHABILITATION | Facility: HOSPITAL | Age: 60
End: 2025-06-27
Attending: ORTHOPAEDIC SURGERY
Payer: OTHER GOVERNMENT

## 2025-06-27 DIAGNOSIS — M25.511 RIGHT SHOULDER PAIN, UNSPECIFIED CHRONICITY: ICD-10-CM

## 2025-06-27 DIAGNOSIS — E78.1 PURE HYPERTRIGLYCERIDEMIA: ICD-10-CM

## 2025-06-27 DIAGNOSIS — R68.89 ACTIVITY INTOLERANCE: Primary | ICD-10-CM

## 2025-06-27 PROCEDURE — 97110 THERAPEUTIC EXERCISES: CPT | Mod: PN,CQ

## 2025-06-27 PROCEDURE — 97140 MANUAL THERAPY 1/> REGIONS: CPT | Mod: PN,CQ

## 2025-06-27 RX ORDER — FENOFIBRATE 54 MG/1
54 TABLET ORAL
Qty: 90 TABLET | Refills: 3 | Status: SHIPPED | OUTPATIENT
Start: 2025-06-27

## 2025-06-27 NOTE — TELEPHONE ENCOUNTER
No care due was identified.  [facility] Embedded Care Due Messages. Reference number: [id number].   6/27/2025 2:04:46 AM CDT

## 2025-06-27 NOTE — TELEPHONE ENCOUNTER
Refill Decision Note   Erickson Decker  is requesting a refill authorization.  Brief Assessment and Rationale for Refill:  Approve     Medication Therapy Plan:         Comments:     Note composed:5:35 AM 06/27/2025

## 2025-06-27 NOTE — PROGRESS NOTES
"  Outpatient Rehab    Physical Therapy Visit    Patient Name: Wally Decker  MRN: [id number]  YOB: 1965  Encounter Date: 6/27/2025    Therapy Diagnosis:   Encounter Diagnoses   Name Primary?    Activity intolerance Yes    Right shoulder pain, unspecified chronicity      Physician: Wilfredo Moraes,*    Physician Orders: Eval and Treat  Medical Diagnosis: Tear of right rotator cuff, unspecified tear extent, unspecified whether traumatic  Rotator cuff syndrome, unspecified laterality  Surgical Diagnosis: Not applicable for this Episode   Surgical Date: Not applicable for this Episode  Days Since Last Surgery: Not applicable for this Episode    Visit # / Visits Authorized: 4 / 15 (5 total)  Insurance Authorization Period: 6/7/2025 to 10/5/2025  Date of Evaluation: 6/13/2025  Plan of Care Certification: 6/13/2025 to 8/1/2025      PT/PTA: PTA   Number of PTA visits since last PT visit: 1  Time In: 0900   Time Out: 1000  Total Time (in minutes): 60   Total Billable Time (in minutes): 29 due to being doubled    FOTO:  Intake Score: 57%  Survey Score 2: 59%  Survey Score 3:  %    Precautions:     Standard      Subjective   "It's just here."- R UT. Nonremarkable post previous..  Pain reported as 1/10. 1-2 R UT    Objective            Treatment:  Therapeutic Exercise  TE 1: UBE lvl4 x8' (reversing midway)  TE 2: Seated: Upper trapezius stretches 3x30s ea, Levator scapulae stretches 3x30s ea, Middle trapezius/Rhomboid stretches 3x30s, SCM/Scalene stretches 3x30s ea  TE 3: Standing: Doorway pectoral stretches 3x30s; UE ranger archs using 2# x20; Theratubing stability training x20 ea: B shoulder extension w/scapular retractions (blue), B shoulder ER @side w/scapular retractions (blue), B shoulder horizontal abduction w/scapular retractions (blue), R adduction (blue), R internal rotation @side (blue), Lat pulls (blue), Ws (green); Serratus press-up from bar x20; 90* 3 way using 3# x20 ea: B flexion, " scaption, & abduction  TE 4: Sidelying: R shoulder ER @side against gravity using 3# x20  TE 5: Prone using 3# x20 ea: R shoulder flexion, R scaption, R horizontal abduction, R extension, Ws w/o wt  TE 7: *Reviewed ulnar nerve glide*  Manual Therapy  MT 1: PROM/end range stretching to R shoulder in supine  MT 2: Grade II lateral distraction to R GH joint. Grade II/III oscillations (shoulder abd/add) in ~ 45* abduction  MT 4: Myofascial release to R UT, supraspinatus, & deep neck neck flexors      Time Entry(in minutes):  Manual Therapy Time Entry: 8  Therapeutic Exercise Time Entry: 50    Assessment & Plan   Assessment: The patient reported to physical therapy stating slight upper trapezius discomfort; however, during activity radicular symptoms reported on this date. Patient reports this intermittently approximately every other day; reviewed ulnar nerve glide for management. Wally Decker was progressed slightly with increased resistance for strength and stability training. Patient demonstrated fatigue; however, reported decreased symptoms following treatment.  Evaluation/Treatment Tolerance: Other (Comment)    The patient will continue to benefit from skilled outpatient physical therapy in order to address the deficits listed in the problem list on the initial evaluation, provide patient and family education, and maximize the patients level of independence in the home and community environments.     The patient's spiritual, cultural, and educational needs were considered, and the patient is agreeable to the plan of care and goals.     Education  Education was done with Patient. The patient's learning style includes Demonstration and Listening. The patient Demonstrates understanding and Verbalizes understanding.         The patient was instructed in progressions as noted with slight cues initially for technique. Reviewed ulnar nerve glides for radicular symptom management as needed.       Plan: The patient is to be  progressed within the established plan of care as tolerated in order to accomplish stated goals. Plan to rhythmic stabilization as tolerated.    Goals:   Active       Long Term Goals        Facilitate improved upper quadrant flexibility  (Progressing)       Start:  06/13/25    Expected End:  08/01/25            Patient will sleep > 6 hours without interruption by R shoulder pain at least 5 of 7 nights per week (Progressing)       Start:  06/13/25    Expected End:  08/01/25            Patient will perform his usual work tasks without increased R shoulder pain (Progressing)       Start:  06/13/25    Expected End:  08/01/25            Patient will consistently perform self care and dressing activities without increased shoulder pain (Progressing)       Start:  06/13/25    Expected End:  08/01/25            Patient will reach to 2nd shelf of overhead cabinet using R UE to put away/retrieve dishes without increased shoulder pain (Progressing)       Start:  06/13/25    Expected End:  08/01/25            Patient will resume yard work activities with no more than minimal increase in R shoulder pain (Progressing)       Start:  06/13/25    Expected End:  08/01/25            Improve functional outcomes score to > 70% as indicated by FOTO shoulder survey (Progressing)       Start:  06/13/25    Expected End:  08/01/25            Patient will be independent in HEP.  (Progressing)       Start:  06/13/25    Expected End:  08/01/25               Short Term Goals       Decrease R scapulothoracic crepitus to minimal  (Progressing)       Start:  06/13/25    Expected End:  07/08/25            Facilitate improved posture (Progressing)       Start:  06/13/25    Expected End:  07/08/25            Facilitate improved R GH joint play (Progressing)       Start:  06/13/25    Expected End:  07/08/25            Patient will utilize computer mouse with his R hand > 1 hour without increased shoulder pain (Progressing)       Start:  06/13/25     Expected End:  07/08/25            Patient will utilize R UE to assist with driving tasks without increased shoulder pain > 50% of the time (Progressing)       Start:  06/13/25    Expected End:  07/08/25                Krissy Bhatia, PTA

## 2025-07-02 ENCOUNTER — CLINICAL SUPPORT (OUTPATIENT)
Dept: REHABILITATION | Facility: HOSPITAL | Age: 60
End: 2025-07-02
Payer: OTHER GOVERNMENT

## 2025-07-02 DIAGNOSIS — M25.511 RIGHT SHOULDER PAIN, UNSPECIFIED CHRONICITY: ICD-10-CM

## 2025-07-02 DIAGNOSIS — R68.89 ACTIVITY INTOLERANCE: Primary | ICD-10-CM

## 2025-07-02 PROCEDURE — 97140 MANUAL THERAPY 1/> REGIONS: CPT | Mod: PN

## 2025-07-02 PROCEDURE — 97110 THERAPEUTIC EXERCISES: CPT | Mod: PN

## 2025-07-05 NOTE — PROGRESS NOTES
"  Outpatient Rehab    Physical Therapy Visit    Patient Name: Wally Decker  MRN: [id number]  YOB: 1965  Encounter Date: 7/2/2025    Therapy Diagnosis:   Encounter Diagnoses   Name Primary?    Activity intolerance Yes    Right shoulder pain, unspecified chronicity      Physician: Wilfredo Moraes,*    Physician Orders: Eval and Treat  Medical Diagnosis: Tear of right rotator cuff, unspecified tear extent, unspecified whether traumatic  Rotator cuff syndrome, unspecified laterality  Surgical Diagnosis: Not applicable for this Episode   Surgical Date: Not applicable for this Episode  Days Since Last Surgery: Not applicable for this Episode    Visit # / Visits Authorized:  5 / 15  Insurance Authorization Period: 6/7/2025 to 10/5/2025  Date of Evaluation: 6/13/2025  Plan of Care Certification: 6/13/2025 to 8/1/2025      PT/PTA: PT   Number of PTA visits since last PT visit:   Time In: 1534   Time Out: 1630  Total Time (in minutes): 56   Total Billable Time (in minutes): 30 (billable time reduced due to overlapping time with 1 other patient, rehab tech assisted in this treatment session)     FOTO:  Intake Score: 57%  Survey Score 2: 59%  Survey Score 3:  %    Precautions:     Standard      Subjective   "Tuesday was a rough day. I think it was somewhat related to stress".  Pain reported as 2/10. R shoulder    Objective          Treatment:  Therapeutic Exercise  TE 1: UBE lvl4 x8' (reversing midway)  TE 3: UE ranger archs, flexion using 2# x20; Theratubing stability training x20 ea: B shoulder extension w/scapular retractions (blue), B shoulder ER @side w/scapular retractions (blue), B shoulder horizontal abduction w/scapular retractions (blue), R adduction (blue), R internal rotation @side (blue), ER @ side (blue) Lat pulls (blue), Ws (green); Serratus press-up from bar x20; 90* 3 way using 3# x20 ea: B flexion, scaption, & abduction  TE 4: Sidelying: R shoulder ER @side against gravity using 3# " x20  TE 5: Prone using 3# x20 ea: R shoulder flexion, R scaption, R horizontal abduction, R extension, Ws w/1# wt  TE 6: R sidelying sleeper stretch 10 x 3s  Manual Therapy  MT 1: PROM/end range stretching to R shoulder in supine  MT 2: Grade II lateral distraction to R GH joint. Grade II/III oscillations (shoulder abd/add) in ~ 45* abduction  MT 3: Grade II anterior to posterior glide of GH joint, superior to inferior glide of GH joint.  MT 4: Trigger point release to R UT    Time Entry(in minutes):  Manual Therapy Time Entry: 9  Therapeutic Exercise Time Entry: 43    Assessment & Plan   Assessment: Wally presented to PT today reporting relatively mild shoulder pain and UE paresthesias.  However, he did experience elevated symptoms on Tuesday, possibly related to stress. He exhibited moderate trigger point in R upper trapezius that improved somewhat following release. His ROM is improving but remains limited.   Evaluation/Treatment Tolerance: Patient tolerated treatment well    The patient will continue to benefit from skilled outpatient physical therapy in order to address the deficits listed in the problem list on the initial evaluation, provide patient and family education, and maximize the patients level of independence in the home and community environments.     The patient's spiritual, cultural, and educational needs were considered, and the patient is agreeable to the plan of care and goals.     Education  Education was done with Patient. The patient's learning style includes Listening and Demonstration. The patient Demonstrates understanding and Verbalizes understanding.         Reviewed sidelying sleeper stretch. Instructed patient is standing sleeper stretch     Plan: The patient is to be progressed within the established plan of care as tolerated in order to accomplish stated goals. Plan to add rhythmic stabilization at multiple angles using BOING/body blade as tolerated.    Goals:   Active       Long  Term Goals        Facilitate improved upper quadrant flexibility  (Ongoing)       Start:  06/13/25    Expected End:  08/01/25            Patient will sleep > 6 hours without interruption by R shoulder pain at least 5 of 7 nights per week (Progressing)       Start:  06/13/25    Expected End:  08/01/25            Patient will perform his usual work tasks without increased R shoulder pain (Progressing)       Start:  06/13/25    Expected End:  08/01/25            Patient will consistently perform self care and dressing activities without increased shoulder pain (Progressing)       Start:  06/13/25    Expected End:  08/01/25            Patient will reach to 2nd shelf of overhead cabinet using R UE to put away/retrieve dishes without increased shoulder pain (Progressing)       Start:  06/13/25    Expected End:  08/01/25            Patient will resume yard work activities with no more than minimal increase in R shoulder pain (Ongoing)       Start:  06/13/25    Expected End:  08/01/25            Improve functional outcomes score to > 70% as indicated by FOTO shoulder survey (Progressing)       Start:  06/13/25    Expected End:  08/01/25            Patient will be independent in HEP.  (Progressing)       Start:  06/13/25    Expected End:  08/01/25               Short Term Goals       Decrease R scapulothoracic crepitus to minimal  (Progressing)       Start:  06/13/25    Expected End:  07/08/25            Facilitate improved posture (Progressing)       Start:  06/13/25    Expected End:  07/08/25            Facilitate improved R GH joint play (Progressing)       Start:  06/13/25    Expected End:  07/08/25            Patient will utilize computer mouse with his R hand > 1 hour without increased shoulder pain (Progressing)       Start:  06/13/25    Expected End:  07/08/25            Patient will utilize R UE to assist with driving tasks without increased shoulder pain > 50% of the time (Progressing)       Start:  06/13/25     Expected End:  07/08/25                Gris Culver, PT

## 2025-07-07 DIAGNOSIS — E78.1 PURE HYPERTRIGLYCERIDEMIA: ICD-10-CM

## 2025-07-07 RX ORDER — ROSUVASTATIN CALCIUM 20 MG/1
TABLET, COATED ORAL
Qty: 90 TABLET | Refills: 3 | Status: SHIPPED | OUTPATIENT
Start: 2025-07-07

## 2025-07-07 NOTE — TELEPHONE ENCOUNTER
No care due was identified.  [facility] [place] Embedded Care Due Messages. Reference number: [id number].   7/07/2025 2:24:19 AM CDT

## 2025-07-07 NOTE — TELEPHONE ENCOUNTER
Refill Decision Note   Erickson Decker  is requesting a refill authorization.  Brief Assessment and Rationale for Refill:  Approve     Medication Therapy Plan:        Comments:     Note composed:8:06 AM 07/07/2025

## 2025-07-09 ENCOUNTER — CLINICAL SUPPORT (OUTPATIENT)
Dept: REHABILITATION | Facility: HOSPITAL | Age: 60
End: 2025-07-09
Payer: OTHER GOVERNMENT

## 2025-07-09 DIAGNOSIS — M25.511 RIGHT SHOULDER PAIN, UNSPECIFIED CHRONICITY: ICD-10-CM

## 2025-07-09 DIAGNOSIS — R68.89 ACTIVITY INTOLERANCE: Primary | ICD-10-CM

## 2025-07-09 PROCEDURE — 97110 THERAPEUTIC EXERCISES: CPT | Mod: PN,CQ

## 2025-07-09 NOTE — PROGRESS NOTES
"  Outpatient Rehab    Physical Therapy Visit    Patient Name: Wally Decker  MRN: [id number]  YOB: 1965  Encounter Date: 7/9/2025    Therapy Diagnosis:   Encounter Diagnoses   Name Primary?    Activity intolerance Yes    Right shoulder pain, unspecified chronicity      Physician: Wilfredo Moraes,*    Physician Orders: Eval and Treat  Medical Diagnosis: Tear of right rotator cuff, unspecified tear extent, unspecified whether traumatic  Rotator cuff syndrome, unspecified laterality  Surgical Diagnosis: Not applicable for this Episode   Surgical Date: Not applicable for this Episode  Days Since Last Surgery: Not applicable for this Episode    Visit # / Visits Authorized: 6 / 15 (7 total)  Insurance Authorization Period: 6/7/2025 to 10/5/2025  Date of Evaluation: 6/13/2025  Plan of Care Certification: 6/13/2025 to 8/1/2025      PT/PTA: PTA   Number of PTA visits since last PT visit: 1  Time In: 1528   Time Out: 1609  Total Time (in minutes): 41   Total Billable Time (in minutes): 41    FOTO:  Intake Score: 57%  Survey Score 2: 59%  Survey Score 3:  %    Precautions:     Standard      Subjective   "It's just stiff. Yeah, a little pain." Post previous: "It was alright; my neck was a little stiff. But that isn't unusual."  "I think just the small repitition bothers it; I am not really sure. I haven't figured it out, but it only hurts right were the tear is. It isn't the whole arm, so it is getting better.".  Pain reported as 1/10. R shoulder    Objective            Treatment:  Therapeutic Exercise  TE 1: UBE lvl4 x8' (reversing midway)  TE 2: Standing: UE ranger using 2# x20 ea: Archs, Flexion, Scaption; Theratubing stability training x20 ea: B shoulder extension w/scapular retractions (black), R adduction (black), R internal rotation @side (black), Ws (blue), B shoulder ER @side w/scapular retractions (blue), B shoulder horizontal abduction w/scapular retractions (blue), Lat pulls (blue); Serratus " press-up from bar x20; 90* 3 way using 3# x20 ea: B flexion, scaption, & abduction  TE 3: Seated: R Rhythmic stabilization using BOING' x1' ea: Shoulder flex/ext @90*, Abd/Add @90*, Horizontal Abd/Add @90*, IR/ER @side  TE 4: Sidelying: R shoulder ER @side against gravity using 4# x20  TE 5: Prone using 4# x20 ea: R shoulder flexion, R scaption, R horizontal abduction, R extension, Ws using 1#      Time Entry(in minutes):  Therapeutic Exercise Time Entry: 41    Assessment & Plan   Assessment: The patient reported to physical therapy stating tension and minimal shoulder discomfort. Wally Decker was progressed with increased resistance and incorporate rhythmic stabilization to aide in increased functional strength and stability. Single rest break required during abduction/adduction and horizontal abduction/adduction rhythmic stabilization. Functional range of motion noted with minimal internal rotation deficit compared to contralateral side.  Evaluation/Treatment Tolerance: Patient tolerated treatment well    The patient will continue to benefit from skilled outpatient physical therapy in order to address the deficits listed in the problem list on the initial evaluation, provide patient and family education, and maximize the patients level of independence in the home and community environments.     The patient's spiritual, cultural, and educational needs were considered, and the patient is agreeable to the plan of care and goals.     Education  Education was done with Patient. The patient's learning style includes Demonstration and Listening. The patient Demonstrates understanding and Verbalizes understanding.         The patient was instructed in progressions as noted, and reviewed upcoming progressions as well.       Plan: The patient is to be progressed within the established plan of care as tolerated in order to accomplish stated goals. Plan to incorporate overhead rhythmic stabilization in flex/ext and  abd/add.    Goals:   Active       Long Term Goals        Facilitate improved upper quadrant flexibility  (Progressing)       Start:  06/13/25    Expected End:  08/01/25            Patient will sleep > 6 hours without interruption by R shoulder pain at least 5 of 7 nights per week (Progressing)       Start:  06/13/25    Expected End:  08/01/25            Patient will perform his usual work tasks without increased R shoulder pain (Progressing)       Start:  06/13/25    Expected End:  08/01/25            Patient will consistently perform self care and dressing activities without increased shoulder pain (Progressing)       Start:  06/13/25    Expected End:  08/01/25            Patient will reach to 2nd shelf of overhead cabinet using R UE to put away/retrieve dishes without increased shoulder pain (Progressing)       Start:  06/13/25    Expected End:  08/01/25            Patient will resume yard work activities with no more than minimal increase in R shoulder pain (Progressing)       Start:  06/13/25    Expected End:  08/01/25            Improve functional outcomes score to > 70% as indicated by FOTO shoulder survey (Progressing)       Start:  06/13/25    Expected End:  08/01/25            Patient will be independent in HEP.  (Progressing)       Start:  06/13/25    Expected End:  08/01/25               Short Term Goals       Decrease R scapulothoracic crepitus to minimal  (Progressing)       Start:  06/13/25    Expected End:  07/08/25            Facilitate improved posture (Progressing)       Start:  06/13/25    Expected End:  07/08/25            Facilitate improved R GH joint play (Progressing)       Start:  06/13/25    Expected End:  07/08/25            Patient will utilize computer mouse with his R hand > 1 hour without increased shoulder pain (Progressing)       Start:  06/13/25    Expected End:  07/08/25            Patient will utilize R UE to assist with driving tasks without increased shoulder pain > 50% of the  time (Progressing)       Start:  06/13/25    Expected End:  07/08/25                Krissy Bhatia, PTA

## 2025-07-11 ENCOUNTER — CLINICAL SUPPORT (OUTPATIENT)
Dept: REHABILITATION | Facility: HOSPITAL | Age: 60
End: 2025-07-11
Payer: OTHER GOVERNMENT

## 2025-07-11 DIAGNOSIS — M25.511 RIGHT SHOULDER PAIN, UNSPECIFIED CHRONICITY: ICD-10-CM

## 2025-07-11 DIAGNOSIS — R68.89 ACTIVITY INTOLERANCE: Primary | ICD-10-CM

## 2025-07-11 PROCEDURE — 97110 THERAPEUTIC EXERCISES: CPT | Mod: PN,CQ

## 2025-07-11 NOTE — PROGRESS NOTES
"  Outpatient Rehab    Physical Therapy Visit    Patient Name: Wally Decker  MRN: [id number]  YOB: 1965  Encounter Date: 7/11/2025    Therapy Diagnosis:   Encounter Diagnoses   Name Primary?    Activity intolerance Yes    Right shoulder pain, unspecified chronicity      Physician: Wilfredo Moraes,*    Physician Orders: Eval and Treat  Medical Diagnosis: Tear of right rotator cuff, unspecified tear extent, unspecified whether traumatic  Rotator cuff syndrome, unspecified laterality  Surgical Diagnosis: Not applicable for this Episode   Surgical Date: Not applicable for this Episode  Days Since Last Surgery: Not applicable for this Episode    Visit # / Visits Authorized: 7 / 15 (8 total)  Insurance Authorization Period: 6/7/2025 to 10/5/2025  Date of Evaluation: 6/13/2025  Plan of Care Certification: 6/13/2025 to 8/1/2025      PT/PTA: PTA   Number of PTA visits since last PT visit: 1  Time In: 0801   Time Out: 0847  Total Time (in minutes): 46   Total Billable Time (in minutes): 23 due to being doubled    FOTO:  Intake Score: 57%  Survey Score 2: 59%  Survey Score 3:  %    Precautions:     Standard      Subjective   "I am doing alright. No pain today." Post previous: "I was a little stiff, but later I was fine. It actually went away.".  Pain reported as 0/10. R shoulder    Objective            Treatment:  Therapeutic Exercise  TE 1: UBE lvl5 x8' (reversing midway)  TE 2: Standing: UE ranger using 2# x20 ea: Archs, Flexion, Scaption; Theratubing stability training x20 ea: B shoulder extension w/scapular retractions (black), R adduction (black), R internal rotation @side (black), Ws (blue), B shoulder ER @side w/scapular retractions (blue), B shoulder horizontal abduction w/scapular retractions (blue), Lat pulls (blue); Serratus press-up from bar x20; 90* 3 way using 3# x20 ea: B flexion, scaption, & abduction  TE 3: Standing: R Rhythmic stabilization using BOING' x1' ea: Overhead shoulder " flex/ext, Overhead abd/add, Flex/ext @90*, Abd/Add @90*, Horizontal Abd/Add @90*, IR/ER @side  TE 4: Sidelying: R shoulder ER @side against gravity using 4# x20  TE 5: Prone using 4# x20 ea: R shoulder flexion, R scaption, R horizontal abduction, R extension, Ws using 1#      Time Entry(in minutes):  Therapeutic Exercise Time Entry: 45    Assessment & Plan   Assessment: The patient reported to physical therapy without present complaint. Wally Decker was progressed with increased functional strength and stability training. Excess fatigue noted on this date, requiring increased intermittent rest breaks.  Evaluation/Treatment Tolerance: Patient tolerated treatment well    The patient will continue to benefit from skilled outpatient physical therapy in order to address the deficits listed in the problem list on the initial evaluation, provide patient and family education, and maximize the patients level of independence in the home and community environments.     The patient's spiritual, cultural, and educational needs were considered, and the patient is agreeable to the plan of care and goals.     Education  Education was done with Patient. The patient's learning style includes Demonstration and Listening. The patient Demonstrates understanding and Verbalizes understanding.         The patient was instructed in progressions as noted.       Plan: The patient is to be progressed within the established plan of care as tolerated in order to accomplish stated goals. Plan to progress weight bearing stability training.    Goals:   Active       Long Term Goals        Facilitate improved upper quadrant flexibility  (Progressing)       Start:  06/13/25    Expected End:  08/01/25            Patient will sleep > 6 hours without interruption by R shoulder pain at least 5 of 7 nights per week (Progressing)       Start:  06/13/25    Expected End:  08/01/25            Patient will perform his usual work tasks without increased R  shoulder pain (Progressing)       Start:  06/13/25    Expected End:  08/01/25            Patient will consistently perform self care and dressing activities without increased shoulder pain (Progressing)       Start:  06/13/25    Expected End:  08/01/25            Patient will reach to 2nd shelf of overhead cabinet using R UE to put away/retrieve dishes without increased shoulder pain (Met)       Start:  06/13/25    Expected End:  08/01/25    Resolved:  07/11/25         Patient will resume yard work activities with no more than minimal increase in R shoulder pain (Progressing)       Start:  06/13/25    Expected End:  08/01/25            Improve functional outcomes score to > 70% as indicated by FOTO shoulder survey (Progressing)       Start:  06/13/25    Expected End:  08/01/25            Patient will be independent in HEP.  (Progressing)       Start:  06/13/25    Expected End:  08/01/25               Short Term Goals       Decrease R scapulothoracic crepitus to minimal  (Progressing)       Start:  06/13/25    Expected End:  07/08/25            Facilitate improved posture (Progressing)       Start:  06/13/25    Expected End:  07/08/25            Facilitate improved R GH joint play (Progressing)       Start:  06/13/25    Expected End:  07/08/25            Patient will utilize computer mouse with his R hand > 1 hour without increased shoulder pain (Progressing)       Start:  06/13/25    Expected End:  07/08/25            Patient will utilize R UE to assist with driving tasks without increased shoulder pain > 50% of the time (Progressing)       Start:  06/13/25    Expected End:  07/08/25                Krissy Bhatia, CARTER

## 2025-07-16 ENCOUNTER — CLINICAL SUPPORT (OUTPATIENT)
Dept: REHABILITATION | Facility: HOSPITAL | Age: 60
End: 2025-07-16
Payer: OTHER GOVERNMENT

## 2025-07-16 DIAGNOSIS — R68.89 ACTIVITY INTOLERANCE: Primary | ICD-10-CM

## 2025-07-16 DIAGNOSIS — M25.511 RIGHT SHOULDER PAIN, UNSPECIFIED CHRONICITY: ICD-10-CM

## 2025-07-16 PROCEDURE — 97110 THERAPEUTIC EXERCISES: CPT | Mod: PN,CQ

## 2025-07-16 NOTE — PROGRESS NOTES
"  Outpatient Rehab    Physical Therapy Visit    Patient Name: Wally Decker  MRN: [id number]  YOB: 1965  Encounter Date: 7/16/2025    Therapy Diagnosis:   Encounter Diagnoses   Name Primary?    Activity intolerance Yes    Right shoulder pain, unspecified chronicity      Physician: Wilfredo Moraes,*    Physician Orders: Eval and Treat  Medical Diagnosis: Tear of right rotator cuff, unspecified tear extent, unspecified whether traumatic  Rotator cuff syndrome, unspecified laterality  Surgical Diagnosis: Not applicable for this Episode   Surgical Date: Not applicable for this Episode  Days Since Last Surgery: Not applicable for this Episode    Visit # / Visits Authorized: 8 / 15 (9 total)  Insurance Authorization Period: 6/7/2025 to 10/5/2025  Date of Evaluation: 6/13/2025  Plan of Care Certification: 6/13/2025 to 8/1/2025      PT/PTA: PTA   Number of PTA visits since last PT visit: 2  Time In: 1545   Time Out: 1625  Total Time (in minutes): 40   Total Billable Time (in minutes): 20 due to being doubled    FOTO:  Intake Score: 57%  Survey Score 2: 59%  Survey Score 3: Not applicable for this Episode%    Precautions:  Additional Precaution and Protocol Details: Standard; drug induced immunodeficiency  Additional Precautions and Protocol Details: Standard      Subjective   "I am hurting a little bit today; I think it's just been a long day." Post previous session: reports soreness. Post session: "It doesn't actually hurt now.".  Pain reported as 4/10. R shoulder "right where the damage is"    Objective            Treatment:  Therapeutic Exercise  TE 1: UBE lvl6 x8' (reversing midway)  TE 2: Standing: UE ranger using 3# x20 ea: Archs, Flexion, Scaption; Theratubing stability training x20 ea: B shoulder extension w/scapular retractions (black), R adduction (black), R internal rotation @side (black), Ws (blue), B shoulder ER @side w/scapular retractions (black), B shoulder horizontal abduction " w/scapular retractions (blue), Lat pulls (black); 90* 3 way using 3# x20 ea: B flexion, scaption, & abduction  TE 3: Standing: R Rhythmic stabilization using BOING' x1' ea: Overhead shoulder flex/ext, Overhead abd/add, Flex/ext @90*, Abd/Add @90*, Horizontal Abd/Add @90*, IR/ER @side  TE 4: Sidelying: R shoulder ER @side against gravity using 4# x20  TE 5: Prone using 4# x20 ea: R shoulder flexion, R scaption, R horizontal abduction, R extension, Ws using 2#  TE 6: Modified plank serratus press-up x20      Time Entry(in minutes):  Therapeutic Exercise Time Entry: 40    Assessment & Plan   Assessment: The patient reported to physical therapy stating moderate shoulder discomfort. Wally Decker was progressed with increased stability training. Fatigue noted; however, decreased rest breaks required on this date.  Evaluation/Treatment Tolerance: Patient tolerated treatment well    The patient will continue to benefit from skilled outpatient physical therapy in order to address the deficits listed in the problem list on the initial evaluation, provide patient and family education, and maximize the patients level of independence in the home and community environments.     The patient's spiritual, cultural, and educational needs were considered, and the patient is agreeable to the plan of care and goals.     Education  Education was done with Patient. The patient's learning style includes Demonstration and Listening. The patient Demonstrates understanding and Verbalizes understanding.         The patient was instructed in slight progression as noted.       Plan: The patient is to be progressed within the established plan of care as tolerated in order to accomplish stated goals. Plan to incorporate additional weight bearing stability training.    Goals:   Active       Long Term Goals        Facilitate improved upper quadrant flexibility  (Progressing)       Start:  06/13/25    Expected End:  08/01/25            Patient will  sleep > 6 hours without interruption by R shoulder pain at least 5 of 7 nights per week (Progressing)       Start:  06/13/25    Expected End:  08/01/25            Patient will perform his usual work tasks without increased R shoulder pain (Progressing)       Start:  06/13/25    Expected End:  08/01/25            Patient will consistently perform self care and dressing activities without increased shoulder pain (Progressing)       Start:  06/13/25    Expected End:  08/01/25            Patient will reach to 2nd shelf of overhead cabinet using R UE to put away/retrieve dishes without increased shoulder pain (Met)       Start:  06/13/25    Expected End:  08/01/25    Resolved:  07/11/25         Patient will resume yard work activities with no more than minimal increase in R shoulder pain (Progressing)       Start:  06/13/25    Expected End:  08/01/25            Improve functional outcomes score to > 70% as indicated by FOTO shoulder survey (Progressing)       Start:  06/13/25    Expected End:  08/01/25            Patient will be independent in HEP.  (Progressing)       Start:  06/13/25    Expected End:  08/01/25               Short Term Goals       Decrease R scapulothoracic crepitus to minimal  (Progressing)       Start:  06/13/25    Expected End:  07/08/25            Facilitate improved posture (Progressing)       Start:  06/13/25    Expected End:  07/08/25            Facilitate improved R GH joint play (Progressing)       Start:  06/13/25    Expected End:  07/08/25            Patient will utilize computer mouse with his R hand > 1 hour without increased shoulder pain (Progressing)       Start:  06/13/25    Expected End:  07/08/25            Patient will utilize R UE to assist with driving tasks without increased shoulder pain > 50% of the time (Progressing)       Start:  06/13/25    Expected End:  07/08/25                Krissy Bhatia, CARTER

## 2025-07-18 ENCOUNTER — CLINICAL SUPPORT (OUTPATIENT)
Dept: REHABILITATION | Facility: HOSPITAL | Age: 60
End: 2025-07-18
Payer: OTHER GOVERNMENT

## 2025-07-18 DIAGNOSIS — R68.89 ACTIVITY INTOLERANCE: Primary | ICD-10-CM

## 2025-07-18 DIAGNOSIS — M25.511 RIGHT SHOULDER PAIN, UNSPECIFIED CHRONICITY: ICD-10-CM

## 2025-07-18 PROCEDURE — 97110 THERAPEUTIC EXERCISES: CPT | Mod: PN,CQ

## 2025-07-18 NOTE — PROGRESS NOTES
Outpatient Rehab    Physical Therapy Visit    Patient Name: Wally Decker  MRN: [id number]  YOB: 1965  Encounter Date: 7/18/2025    Therapy Diagnosis:   Encounter Diagnoses   Name Primary?    Activity intolerance Yes    Right shoulder pain, unspecified chronicity      Physician: Wilfredo Moraes,*    Physician Orders: Eval and Treat  Medical Diagnosis: Tear of right rotator cuff, unspecified tear extent, unspecified whether traumatic  Rotator cuff syndrome, unspecified laterality  Surgical Diagnosis: Not applicable for this Episode   Surgical Date: Not applicable for this Episode  Days Since Last Surgery: Not applicable for this Episode    Visit # / Visits Authorized: 9 / 15 (10 total)  Insurance Authorization Period: 6/7/2025 to 10/5/2025  Date of Evaluation: 6/13/2025  Plan of Care Certification: 6/13/2025 to 8/1/2025      PT/PTA: PTA   Number of PTA visits since last PT visit: 3  Time In: 0802   Time Out: 0848  Total Time (in minutes): 46   Total Billable Time (in minutes): 23 due being doubled    FOTO:  Intake Score (%): 57  Survey Score 2 (%): 59  Survey Score 3 (%): 65    Precautions:  Additional Precaution and Protocol Details: Standard; drug induced immunodeficiency  Additional Precautions and Protocol Details: Standard      Subjective   Denies pain upon coming in. Reports decreased pain following previous session..  Pain reported as 0/10. N/A at this time    Objective            Treatment:  Therapeutic Exercise  TE 1: UBE lvl6 x8' (reversing midway)  TE 2: Standing: UE ranger using 3# x20 ea: Archs, Flexion, Scaption; Theratubing stability training x20 ea: B shoulder extension w/scapular retractions (black), R adduction (black), R internal rotation @side (black), Ws (blue), B shoulder ER @side w/scapular retractions (black), B shoulder horizontal abduction w/scapular retractions (blue), Lat pulls (black); 90* 3 way using 4# x20 ea: B flexion, scaption, & abduction  TE 3: Standing:  R Rhythmic stabilization using BOING' x1' ea: Overhead shoulder flex/ext, Overhead abd/add, Flex/ext @90*, Abd/Add @90*, Horizontal Abd/Add @90*, IR/ER @side  TE 4: Sidelying: R shoulder ER @side against gravity using 4# x20  TE 5: Prone using 4# x20 ea: R shoulder flexion, R scaption, R horizontal abduction, R extension, Ws using 2#  TE 6: Plank serratus press-up x20      Time Entry(in minutes):  Therapeutic Exercise Time Entry: 46    Assessment & Plan   Assessment: The patient reported to physical therapy without present complaint. Wally Decker was progressed with increased functional stability training. Attempted single upper extremity weight bearing in quadruped for scapular clocks; however, due to wrist co-morbidity unable to achieve. Fatigue still noted; however, no rest breaks required.  Evaluation/Treatment Tolerance: Patient tolerated treatment well    The patient will continue to benefit from skilled outpatient physical therapy in order to address the deficits listed in the problem list on the initial evaluation, provide patient and family education, and maximize the patients level of independence in the home and community environments.     The patient's spiritual, cultural, and educational needs were considered, and the patient is agreeable to the plan of care and goals.     Education  Education was done with Patient. The patient's learning style includes Demonstration and Listening. The patient Demonstrates understanding and Verbalizes understanding.         The patient was instructed in progressions as noted. Reviewed scapular clock.       Plan: The patient is to be progressed within the established plan of care as tolerated in order to accomplish stated goals. Plan to incorporate bird-dog stability training subsequently as wrist allow; incorporate incline scapular clock.    Goals:   Active       Long Term Goals        Facilitate improved upper quadrant flexibility  (Progressing)       Start:  06/13/25     Expected End:  08/01/25            Patient will sleep > 6 hours without interruption by R shoulder pain at least 5 of 7 nights per week (Progressing)       Start:  06/13/25    Expected End:  08/01/25            Patient will perform his usual work tasks without increased R shoulder pain (Progressing)       Start:  06/13/25    Expected End:  08/01/25            Patient will consistently perform self care and dressing activities without increased shoulder pain (Progressing)       Start:  06/13/25    Expected End:  08/01/25            Patient will reach to 2nd shelf of overhead cabinet using R UE to put away/retrieve dishes without increased shoulder pain (Met)       Start:  06/13/25    Expected End:  08/01/25    Resolved:  07/11/25         Patient will resume yard work activities with no more than minimal increase in R shoulder pain (Progressing)       Start:  06/13/25    Expected End:  08/01/25            Improve functional outcomes score to > 70% as indicated by FOTO shoulder survey (Progressing)       Start:  06/13/25    Expected End:  08/01/25            Patient will be independent in HEP.  (Progressing)       Start:  06/13/25    Expected End:  08/01/25               Short Term Goals       Decrease R scapulothoracic crepitus to minimal  (Progressing)       Start:  06/13/25    Expected End:  07/08/25            Facilitate improved posture (Progressing)       Start:  06/13/25    Expected End:  07/08/25            Facilitate improved R GH joint play (Progressing)       Start:  06/13/25    Expected End:  07/08/25            Patient will utilize computer mouse with his R hand > 1 hour without increased shoulder pain (Progressing)       Start:  06/13/25    Expected End:  07/08/25            Patient will utilize R UE to assist with driving tasks without increased shoulder pain > 50% of the time (Progressing)       Start:  06/13/25    Expected End:  07/08/25                Krissy Bhatia, PTA

## 2025-07-20 DIAGNOSIS — I10 PRIMARY HYPERTENSION: ICD-10-CM

## 2025-07-20 NOTE — TELEPHONE ENCOUNTER
No care due was identified.  [facility] Embedded Care Due Messages. Reference number: [id number].   7/20/2025 9:30:15 AM CDT

## 2025-07-21 RX ORDER — TELMISARTAN AND HYDROCHLOROTHIAZIDE 12.5; 4 MG/1; MG/1
1 TABLET ORAL DAILY
Qty: 90 TABLET | Refills: 3 | Status: SHIPPED | OUTPATIENT
Start: 2025-07-21

## 2025-07-21 NOTE — TELEPHONE ENCOUNTER
Refill Decision Note   Erickson Decker  is requesting a refill authorization.  Brief Assessment and Rationale for Refill:  Approve     Medication Therapy Plan:        Comments:     Note composed:11:15 AM 07/21/2025

## 2025-07-23 ENCOUNTER — CLINICAL SUPPORT (OUTPATIENT)
Dept: REHABILITATION | Facility: HOSPITAL | Age: 60
End: 2025-07-23
Attending: ORTHOPAEDIC SURGERY
Payer: OTHER GOVERNMENT

## 2025-07-23 DIAGNOSIS — M25.511 RIGHT SHOULDER PAIN, UNSPECIFIED CHRONICITY: Primary | ICD-10-CM

## 2025-07-23 DIAGNOSIS — R68.89 ACTIVITY INTOLERANCE: ICD-10-CM

## 2025-07-23 PROCEDURE — 97110 THERAPEUTIC EXERCISES: CPT | Mod: PN

## 2025-07-23 PROCEDURE — 97140 MANUAL THERAPY 1/> REGIONS: CPT | Mod: PN

## 2025-07-23 NOTE — PROGRESS NOTES
Outpatient Rehab    Physical Therapy Visit    Patient Name: Wally Decker  MRN: [id number]  YOB: 1965  Encounter Date: 7/23/2025    Therapy Diagnosis:   Encounter Diagnoses   Name Primary?    Right shoulder pain, unspecified chronicity Yes    Activity intolerance      Physician: Wilfredo Moraes,*    Physician Orders: Eval and Treat  Medical Diagnosis: Tear of right rotator cuff, unspecified tear extent, unspecified whether traumatic  Rotator cuff syndrome, unspecified laterality  Surgical Diagnosis: Not applicable for this Episode   Surgical Date: Not applicable for this Episode  Days Since Last Surgery: Not applicable for this Episode    Visit # / Visits Authorized: 10 / 15 (10 total)  Insurance Authorization Period: 6/7/2025 to 10/5/2025  Date of Evaluation: 6/13/2025  Plan of Care Certification: 6/13/2025 to 8/1/2025      PT/PTA:     Number of PTA visits since last PT visit:    Time In: 1530   Time Out: 1630  Total Time (in minutes): 60   Total Billable Time (in minutes): 60 due being doubled    FOTO:  Intake Score (%): 57  Survey Score 2 (%): 59  Survey Score 3 (%): 65    Precautions:  Additional Precaution and Protocol Details: Standard; drug induced immunodeficiency  Additional Precautions and Protocol Details: Standard      Subjective   No pain but still weak. Follows up with the doctor in a couple of weeks and would like to try to progress on his own for the time being..  Pain reported as 0/10.      Objective            Treatment:  Therapeutic Exercise  TE 1: UBE lvl6 x8' (reversing midway)  TE 2: Standing: UE ranger using 3# x20 ea: Archs, Flexion, Scaption; Theratubing stability training x20 ea: B shoulder extension w/scapular retractions (black), R adduction (black), R internal rotation @side (black), Ws (blue), B shoulder ER @side w/scapular retractions (black), B shoulder horizontal abduction w/scapular retractions (blue), Lat pulls (black); 90* 3 way using 4# x20 ea: B  flexion, scaption, & abduction  TE 3: Standing: R Rhythmic stabilization using BOING' x1' ea: Overhead shoulder flex/ext, Overhead abd/add, Flex/ext @90*, Abd/Add @90*, Horizontal Abd/Add @90*, IR/ER @side  TE 4: Sidelying: R shoulder ER @side against gravity using 4# x20  TE 5: Prone using 4# x20 ea: R shoulder flexion, R scaption, R horizontal abduction, R extension, Ws using 2#  TE 6: Plank serratus press-up x20  Manual Therapy  MT 1: Ktape to facilitate lower traps. Manual assist of scapular mobility in prone, Manual positioning of humerus with activity      Time Entry(in minutes):  Manual Therapy Time Entry: 15  Therapeutic Exercise Time Entry: 45    Assessment & Plan   Assessment: Patient performed treatment session today with physical and/or verbal input from the clinician. Patient is progressing with rehab. Patient will benefit from continued skilled therapy to maximize functional improvement.  Increased challenge to periscapular muscles with good response by patient. Patient limited by weakness and fatigue this date. Patient required supervision and assistance with setup to maximize activation of target muscles and avoid compensation         The patient will continue to benefit from skilled outpatient physical therapy in order to address the deficits listed in the problem list on the initial evaluation, provide patient and family education, and maximize the patients level of independence in the home and community environments.     The patient's spiritual, cultural, and educational needs were considered, and the patient is agreeable to the plan of care and goals.             Plan: Assess progress next visit with anticipation of discharge    Goals:   Active       Long Term Goals        Facilitate improved upper quadrant flexibility  (Progressing)       Start:  06/13/25    Expected End:  08/01/25            Patient will sleep > 6 hours without interruption by R shoulder pain at least 5 of 7 nights per week  (Progressing)       Start:  06/13/25    Expected End:  08/01/25            Patient will perform his usual work tasks without increased R shoulder pain (Progressing)       Start:  06/13/25    Expected End:  08/01/25            Patient will consistently perform self care and dressing activities without increased shoulder pain (Progressing)       Start:  06/13/25    Expected End:  08/01/25            Patient will reach to 2nd shelf of overhead cabinet using R UE to put away/retrieve dishes without increased shoulder pain (Met)       Start:  06/13/25    Expected End:  08/01/25    Resolved:  07/11/25         Patient will resume yard work activities with no more than minimal increase in R shoulder pain (Progressing)       Start:  06/13/25    Expected End:  08/01/25            Improve functional outcomes score to > 70% as indicated by FOTO shoulder survey (Progressing)       Start:  06/13/25    Expected End:  08/01/25            Patient will be independent in HEP.  (Progressing)       Start:  06/13/25    Expected End:  08/01/25               Short Term Goals       Decrease R scapulothoracic crepitus to minimal  (Progressing)       Start:  06/13/25    Expected End:  07/08/25            Facilitate improved posture (Progressing)       Start:  06/13/25    Expected End:  07/08/25            Facilitate improved R GH joint play (Progressing)       Start:  06/13/25    Expected End:  07/08/25            Patient will utilize computer mouse with his R hand > 1 hour without increased shoulder pain (Progressing)       Start:  06/13/25    Expected End:  07/08/25            Patient will utilize R UE to assist with driving tasks without increased shoulder pain > 50% of the time (Progressing)       Start:  06/13/25    Expected End:  07/08/25                Teresa Edgar, PT

## 2025-07-25 ENCOUNTER — CLINICAL SUPPORT (OUTPATIENT)
Dept: REHABILITATION | Facility: HOSPITAL | Age: 60
End: 2025-07-25
Attending: ORTHOPAEDIC SURGERY
Payer: OTHER GOVERNMENT

## 2025-07-25 DIAGNOSIS — M25.511 RIGHT SHOULDER PAIN, UNSPECIFIED CHRONICITY: Primary | ICD-10-CM

## 2025-07-25 DIAGNOSIS — R68.89 ACTIVITY INTOLERANCE: ICD-10-CM

## 2025-07-25 PROCEDURE — 97110 THERAPEUTIC EXERCISES: CPT | Mod: PN

## 2025-07-25 PROCEDURE — 97140 MANUAL THERAPY 1/> REGIONS: CPT | Mod: PN

## 2025-07-25 NOTE — PROGRESS NOTES
Outpatient Rehab    Physical Therapy Discharge    Patient Name: Wally Decker  MRN: [id number]  YOB: 1965  Encounter Date: 7/25/2025    Therapy Diagnosis:   Encounter Diagnoses   Name Primary?    Right shoulder pain, unspecified chronicity Yes    Activity intolerance      Physician: Wilfredo Moraes,*    Physician Orders: Eval and Treat  Medical Diagnosis: Tear of right rotator cuff, unspecified tear extent, unspecified whether traumatic  Rotator cuff syndrome, unspecified laterality  Surgical Diagnosis: Not applicable for this Episode   Surgical Date: Not applicable for this Episode  Days Since Last Surgery: Not applicable for this Episode    Visit # / Visits Authorized:  11 / 15  Insurance Authorization Period: 6/7/2025 to 10/5/2025  Date of Evaluation: 6/13/2025  Plan of Care Certification: 6/13/2025 to 8/1/2025      PT/PTA:     Number of PTA visits since last PT visit:   Time In: 0800   Time Out: 0856  Total Time (in minutes): 56   Total Billable Time (in minutes): 56    FOTO:  Intake Score (%): 57  Survey Score 2 (%): 59  Survey Score 3 (%): 65    Precautions:  Additional Precaution and Protocol Details: Standard; drug induced immunodeficiency  Additional Precautions and Protocol Details: Standard    Subjective   Tape worked well for him. Had no pain until yesterday when the dog ran into him. Today he's at 1/10 pain, but nothing bad.  Pain reported as 1/10.      Objective            Treatment:  Therapeutic Exercise  TE 1: UBE lvl6 x8' (reversing midway)  TE 2: Standing: UE ranger using 3# x20 ea: Archs, Flexion, Scaption; Theratubing stability training x20 ea: B shoulder extension w/scapular retractions (black), R adduction (black), R internal rotation @side (black), Ws (blue), B shoulder ER @side w/scapular retractions (black), B shoulder horizontal abduction w/scapular retractions (blue), Lat pulls (black); 90* 3 way using 4# x20 ea: B flexion, scaption, & abduction  TE 4: Sidelying:  "R shoulder ER @side against gravity with focus on scap stability x20  TE 5: Prone ; 90/90 lift off with scap squeeze 5"H x 10, prayer lift off 5"H x 10  Manual Therapy  MT 1: Manual assist of scapular mobility in prone, Manual positioning of humerus with activity      Time Entry(in minutes):  Manual Therapy Time Entry: 10  Therapeutic Exercise Time Entry: 46    Assessment & Plan   Assessment: Patient demonstrating improvement in range of motion and strength since starting therapy. Continues with strength deficits affecting R shoulder mobility and functional use of RUE. He demonstrates good understanding of exericse program and would like to continue independently. Follows up with MD next month and will "see what he says." Patient understands that if he begins to regress he may contact his physician for an updated referral to resume therapy. Patient and therapist are in agreement to discharge onto [facility] at this time.       The patient's spiritual, cultural, and educational needs were considered, and the patient is agreeable to the plan of care and goals.           Plan: Discharge onto [facility]. Follow up with MD next month    Goals:   Active       Long Term Goals        Facilitate improved upper quadrant flexibility  (Progressing)       Start:  06/13/25    Expected End:  08/01/25            Patient will sleep > 6 hours without interruption by R shoulder pain at least 5 of 7 nights per week (Progressing)       Start:  06/13/25    Expected End:  08/01/25            Patient will perform his usual work tasks without increased R shoulder pain (Progressing)       Start:  06/13/25    Expected End:  08/01/25            Patient will consistently perform self care and dressing activities without increased shoulder pain (Progressing)       Start:  06/13/25    Expected End:  08/01/25            Patient will reach to 2nd shelf of overhead cabinet using R UE to put away/retrieve dishes without increased shoulder pain (Met)       Start:  " 06/13/25    Expected End:  08/01/25    Resolved:  07/11/25         Patient will resume yard work activities with no more than minimal increase in R shoulder pain (Progressing)       Start:  06/13/25    Expected End:  08/01/25            Improve functional outcomes score to > 70% as indicated by FOTO shoulder survey (Progressing)       Start:  06/13/25    Expected End:  08/01/25            Patient will be independent in HEP.  (Progressing)       Start:  06/13/25    Expected End:  08/01/25               Short Term Goals       Decrease R scapulothoracic crepitus to minimal  (Met)       Start:  06/13/25    Expected End:  07/08/25    Resolved:  07/25/25         Facilitate improved posture (Met)       Start:  06/13/25    Expected End:  07/08/25    Resolved:  07/25/25         Facilitate improved R GH joint play (Met)       Start:  06/13/25    Expected End:  07/08/25    Resolved:  07/25/25         Patient will utilize computer mouse with his R hand > 1 hour without increased shoulder pain (Progressing)       Start:  06/13/25    Expected End:  07/08/25            Patient will utilize R UE to assist with driving tasks without increased shoulder pain > 50% of the time (Progressing)       Start:  06/13/25    Expected End:  07/08/25                Teresa Edgar, PT

## 2025-08-14 ENCOUNTER — OFFICE VISIT (OUTPATIENT)
Dept: ORTHOPEDICS | Facility: CLINIC | Age: 60
End: 2025-08-14
Payer: OTHER GOVERNMENT

## 2025-08-14 VITALS — BODY MASS INDEX: 33.52 KG/M2 | HEIGHT: 70 IN | WEIGHT: 234.13 LBS | RESPIRATION RATE: 18 BRPM

## 2025-08-14 DIAGNOSIS — M75.101 TEAR OF RIGHT ROTATOR CUFF, UNSPECIFIED TEAR EXTENT, UNSPECIFIED WHETHER TRAUMATIC: Primary | ICD-10-CM

## 2025-08-14 PROCEDURE — 99213 OFFICE O/P EST LOW 20 MIN: CPT | Mod: PBBFAC,PO | Performed by: ORTHOPAEDIC SURGERY

## 2025-08-14 PROCEDURE — 99999 PR PBB SHADOW E&M-EST. PATIENT-LVL III: CPT | Mod: PBBFAC,,, | Performed by: ORTHOPAEDIC SURGERY

## 2025-08-14 PROCEDURE — 99213 OFFICE O/P EST LOW 20 MIN: CPT | Mod: S$PBB,,, | Performed by: ORTHOPAEDIC SURGERY
